# Patient Record
Sex: MALE | Race: WHITE | NOT HISPANIC OR LATINO | Employment: FULL TIME | URBAN - METROPOLITAN AREA
[De-identification: names, ages, dates, MRNs, and addresses within clinical notes are randomized per-mention and may not be internally consistent; named-entity substitution may affect disease eponyms.]

---

## 2017-10-16 DIAGNOSIS — N39.0 RECURRENT UTI: ICD-10-CM

## 2017-10-16 DIAGNOSIS — N31.9 NEUROGENIC BLADDER: ICD-10-CM

## 2017-10-16 RX ORDER — SULFAMETHOXAZOLE/TRIMETHOPRIM 800-160 MG
1 TABLET ORAL DAILY
Qty: 90 TABLET | Refills: 0 | Status: SHIPPED | OUTPATIENT
Start: 2017-10-16 | End: 2018-01-16

## 2018-01-16 DIAGNOSIS — N39.0 RECURRENT UTI: ICD-10-CM

## 2018-01-16 DIAGNOSIS — N31.9 NEUROGENIC BLADDER: ICD-10-CM

## 2018-01-16 DIAGNOSIS — N31.9 NEUROGENIC BLADDER: Primary | ICD-10-CM

## 2018-01-16 RX ORDER — SULFAMETHOXAZOLE/TRIMETHOPRIM 800-160 MG
1 TABLET ORAL DAILY
Qty: 90 TABLET | Refills: 0 | Status: SHIPPED | OUTPATIENT
Start: 2018-01-16 | End: 2018-02-02

## 2018-01-23 ENCOUNTER — TELEPHONE (OUTPATIENT)
Dept: GASTROENTEROLOGY | Facility: CLINIC | Age: 43
End: 2018-01-23

## 2018-01-23 NOTE — TELEPHONE ENCOUNTER
Patient scheduled for Flexible Sigmoidoscopy    Indication for procedure. Surveillance, history of polyps    Referring Provider. Self    ? n/a    Arrival time verified? Instructed patient to arrive at 9:45 AM    Facility location verified? Yes [address] [place] [address]    Instructions given regarding prep and procedure patient declined review, has had exam before    Prep Type Enemas    Are you taking any anticoagulants or blood thinners? no    Instructions given? n/a    Electronic implanted devices? denies    Pre procedure teaching completed? Yes    Transportation from procedure?  policy reviewed    H&P / Pre op physical completed? n/a

## 2018-01-30 DIAGNOSIS — D12.6 BENIGN NEOPLASM OF COLON: Primary | ICD-10-CM

## 2018-01-31 ENCOUNTER — SURGERY (OUTPATIENT)
Age: 43
End: 2018-01-31

## 2018-01-31 ENCOUNTER — HOSPITAL ENCOUNTER (OUTPATIENT)
Facility: AMBULATORY SURGERY CENTER | Age: 43
End: 2018-01-31
Attending: INTERNAL MEDICINE
Payer: COMMERCIAL

## 2018-01-31 VITALS
TEMPERATURE: 98.2 F | OXYGEN SATURATION: 95 % | RESPIRATION RATE: 12 BRPM | DIASTOLIC BLOOD PRESSURE: 95 MMHG | SYSTOLIC BLOOD PRESSURE: 142 MMHG

## 2018-01-31 LAB — FLEXIBLE SIGMOIDOSCOPY: NORMAL

## 2018-02-01 LAB — COPATH REPORT: NORMAL

## 2018-02-02 ENCOUNTER — RADIANT APPOINTMENT (OUTPATIENT)
Dept: ULTRASOUND IMAGING | Facility: CLINIC | Age: 43
End: 2018-02-02
Attending: PHYSICIAN ASSISTANT
Payer: COMMERCIAL

## 2018-02-02 ENCOUNTER — OFFICE VISIT (OUTPATIENT)
Dept: UROLOGY | Facility: CLINIC | Age: 43
End: 2018-02-02
Payer: COMMERCIAL

## 2018-02-02 VITALS
WEIGHT: 280 LBS | OXYGEN SATURATION: 98 % | SYSTOLIC BLOOD PRESSURE: 143 MMHG | HEART RATE: 66 BPM | DIASTOLIC BLOOD PRESSURE: 90 MMHG | HEIGHT: 71 IN | BODY MASS INDEX: 39.2 KG/M2

## 2018-02-02 DIAGNOSIS — N31.9 NEUROGENIC BLADDER: ICD-10-CM

## 2018-02-02 DIAGNOSIS — N31.9 NEUROGENIC BLADDER: Primary | ICD-10-CM

## 2018-02-02 DIAGNOSIS — Q64.10 EXSTROPHY OF URINARY BLADDER: ICD-10-CM

## 2018-02-02 DIAGNOSIS — N39.0 RECURRENT UTI: ICD-10-CM

## 2018-02-02 LAB
ANION GAP SERPL CALCULATED.3IONS-SCNC: 6 MMOL/L (ref 3–14)
BUN SERPL-MCNC: 10 MG/DL (ref 7–30)
CALCIUM SERPL-MCNC: 8.9 MG/DL (ref 8.5–10.1)
CHLORIDE SERPL-SCNC: 104 MMOL/L (ref 94–109)
CO2 SERPL-SCNC: 27 MMOL/L (ref 20–32)
CREAT SERPL-MCNC: 0.81 MG/DL (ref 0.66–1.25)
GFR SERPL CREATININE-BSD FRML MDRD: >90 ML/MIN/1.7M2
GLUCOSE SERPL-MCNC: 177 MG/DL (ref 70–99)
POTASSIUM SERPL-SCNC: 4.4 MMOL/L (ref 3.4–5.3)
SODIUM SERPL-SCNC: 137 MMOL/L (ref 133–144)
VIT B12 SERPL-MCNC: 520 PG/ML (ref 193–986)

## 2018-02-02 PROCEDURE — 76770 US EXAM ABDO BACK WALL COMP: CPT | Performed by: STUDENT IN AN ORGANIZED HEALTH CARE EDUCATION/TRAINING PROGRAM

## 2018-02-02 PROCEDURE — 80048 BASIC METABOLIC PNL TOTAL CA: CPT | Performed by: PHYSICIAN ASSISTANT

## 2018-02-02 PROCEDURE — 99213 OFFICE O/P EST LOW 20 MIN: CPT | Performed by: PHYSICIAN ASSISTANT

## 2018-02-02 PROCEDURE — 36415 COLL VENOUS BLD VENIPUNCTURE: CPT | Performed by: PHYSICIAN ASSISTANT

## 2018-02-02 PROCEDURE — 82607 VITAMIN B-12: CPT | Performed by: PHYSICIAN ASSISTANT

## 2018-02-02 RX ORDER — SULFAMETHOXAZOLE/TRIMETHOPRIM 800-160 MG
1 TABLET ORAL DAILY
Qty: 90 TABLET | Refills: 3 | Status: SHIPPED | OUTPATIENT
Start: 2018-02-02 | End: 2019-02-01

## 2018-02-02 ASSESSMENT — PAIN SCALES - GENERAL: PAINLEVEL: NO PAIN (0)

## 2018-02-02 NOTE — MR AVS SNAPSHOT
After Visit Summary   2/2/2018    Ahmet Castaneda    MRN: [id number]           Patient Information     Date Of Birth          1975        Visit Information        Provider Department      2/2/2018 9:30 AM Yady Foreman PA-C [facility]        Today's Diagnoses     Neurogenic bladder    -  1    Exstrophy of urinary bladder        Recurrent UTI           Follow-ups after your visit        Your next 10 appointments already scheduled     Feb 02, 2018  9:30 AM CST   Return Visit with Yady Foreman PA-C   [facility] ([facility])    [id number] [place] [address] [id number] [phone]              Future tests that were ordered for you today     Open Future Orders        Priority Expected Expires Ordered    US Renal Complete [[id number]] Routine  2/2/2019 2/2/2018    Basic metabolic panel Routine  2/2/2019 2/2/2018    Vitamin B12 Routine  2/2/2019 2/2/2018            Who to contact     If you have questions or need follow up information about today's clinic visit or your schedule please contact [facility] [[email or web address]] directly at [phone].  Normal or non-critical lab and imaging results will be communicated to you by MyChart, letter or phone within 4 business days after the clinic has received the results. If you do not hear from us within 7 days, please contact the clinic through [employer] or phone. If you have a critical or abnormal lab result, we will notify you by phone as soon as possible.  Submit refill requests through [employer] or call your pharmacy and they will forward the refill request to us. Please allow 3 business days for your refill to be completed.          Additional Information About Your Visit        MyChart Information     [employer] gives you secure access to your electronic health record. If you see a primary care provider, you can also send messages to your care team and make appointments. If you have questions,  "please call your primary care clinic.  If you do not have a primary care provider, please call [phone] and they will assist you.      [employer] is an electronic gateway that provides easy, online access to your medical records. With [employer], you can request a clinic appointment, read your test results, renew a prescription or communicate with your care team.     To access your existing account, please contact your [facility] or call [phone] for assistance.        Care EveryWhere ID     This is your Care EveryWhere ID. This could be used by other organizations to access your [facility] medical records  [id number]        Your Vitals Were     Pulse Height Pulse Oximetry BMI (Body Mass Index)          66 1.803 m (5' 11\") 98% 39.05 kg/m2         Blood Pressure from Last 3 Encounters:   02/02/18 143/90   01/31/18 (!) 142/95   09/12/16 (!) 141/94    Weight from Last 3 Encounters:   02/02/18 127 kg (280 lb)   09/12/16 123.8 kg (273 lb)   12/17/14 117.9 kg (260 lb)                 Where to get your medicines      These medications were sent to [facility] [address] [place] [address]  [address] [place], [place] [id number]     Phone:  [phone]     sulfamethoxazole-trimethoprim 800-160 MG per tablet          Primary Care Provider Office Phone # Fax #    Yady Micki Foreman PA-C [phone]       [address] [place] [address]        Equal Access to Services     CAM CASTANO : Hadii valdemar tolentino hadasho Sojohnali, waaxda luqadaha, qaybta kaalmada adeegyamichael, shaniqua lyle. So [facility] [phone].    ATENCIÓN: Si habla español, tiene a elizalde disposición servicios gratuitos de asistencia lingüística. Llame al [phone].    We comply with applicable federal civil rights laws and [place] laws. We do not discriminate on the basis of race, color, national origin, age, disability, sex, sexual orientation, or gender identity.            Thank " you!     Thank you for choosing [facility]  for your care. Our goal is always to provide you with excellent care. Hearing back from our patients is one way we can continue to improve our services. Please take a few minutes to complete the written survey that you may receive in the mail after your visit with us. Thank you!             Your Updated Medication List - Protect others around you: Learn how to safely use, store and throw away your medicines at www.disposemymeds.org.          This list is accurate as of 2/2/18  9:12 AM.  Always use your most recent med list.                   Brand Name Dispense Instructions for use Diagnosis    sulfamethoxazole-trimethoprim 800-160 MG per tablet    BACTRIM DS/SEPTRA DS    90 tablet    Take 1 tablet by mouth daily    Recurrent UTI, Neurogenic bladder

## 2018-02-02 NOTE — PROGRESS NOTES
Follow-up Visit for Neurogenic Bladder    Name: Ahmet Castaneda    MRN: [id number]   YOB: 1975  Accompanied at today's visit by: self                 Chief Complaint:   Neurogenic Bladder          History of Present Illness:   Ahmet Castaneda is a 43 year old male with a history of Bladder extrophy. Was initially treated with cystectomy and ureterosigmoidostomy. This was converted in 2001 to an ileal neobladder and Mitrofanoff because of premalignant lesions on colon. 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 Last cystoscopy was 2009. Had some incontinence between catheterizing in 2012 which has since improved somewhat. He leaks when the neobladder fills. He wears an ostomy appliance over his Mitrofanoff and opens the two piece to catheterize several times a day. This does not bother him much. Has been doing well on his regimen of daily Septra DS.    Previous Bladder Surgeries:    Previous Bladder Augmentation: ileal neobladder    Catheterizable stoma:ileal Osvaldo in 1998. Revisions include: revision of stoma in 2001   Anti-incontinence procedures: none    Botox injections: None      Pertinent Medications:    Current Anticholinergics: None    Current Prophylactic antibiotics: Septra daily      Catheterization History:    The patient catheterizes per Osvaldo stoma with a 18F straight catheter q2 hours. Catheterization is performed by self. The patient uses a clean catheter each time.      Incontinence History:    He does leak between voids/caths per Osvaldo. He does not experience urgency of urination. He does not experience stress urinary incontinence. Patient leaks when neobladder fills, so he wears an ostomy device. He is not bothered by this at this time.      Urinary Tract Infection History:    Patient has been on Septra for many years and has not had UTI during this time. Had infection last time he attempted to wean off antibiotics so his previous urologist  "recommended remaining on long-term abx ppx.    Bowel Movement History:    The patient has a bowel movement q1 days. Bowel regimen includes: none         Past Medical History:   No past medical history on file.         Past Surgical History:     Past Surgical History:   Procedure Laterality Date     C LAP,SURG,COLECTOMY,W/ANAST       extrophy of bladder       MITROFANOFF PROCEDURE (APPENDIX CONDUIT)      neobladder            Allergies:   No Known Allergies         Medications:     Current Outpatient Prescriptions   Medication Sig     sulfamethoxazole-trimethoprim (BACTRIM DS/SEPTRA DS) 800-160 MG per tablet Take 1 tablet by mouth daily     sulfamethoxazole-trimethoprim (BACTRIM DS,SEPTRA DS) 800-160 MG per tablet TAKE ONE TABLET BY MOUTH ONCE DAILY     No current facility-administered medications for this visit.              Review of Systems:    ROS: 10 point ROS neg other than the symptoms noted above in the HPI and PMH.          Physical Exam:   B/P: 141/94, T: Data Unavailable, P: 73, R: Data Unavailable  Estimated body mass index is 39.05 kg/(m^2) as calculated from the following:    Height as of this encounter: 1.803 m (5' 11\").    Weight as of this encounter: 127 kg (280 lb).  General: age-appropriate appearing male in NAD sitting in an exam chair.            Data:    Imaging:   Renal ultrasound from today in process.     Labs:  Creatinine   Date Value Ref Range Status   02/02/2018 0.81 0.66 - 1.25 mg/dL Final       Vitamin B12: today's lab pending. Was 489 (wnl) in 9/2016.           Assessment and Plan:   43 year old male with extrophy who now has a non-continent ileal pouch. Offered future visit with Dr. Tate to discuss surgical procedures to convert this to a continent channel but the patient is not interested at this time. He will contact the clinic should he change his mind. Otherwise doing well with no infections.   -Renal ultrasound and vitamin B12 levels in process. Will chart check for results. Cr " normal.    Follow up: 1 year for repeat renal u/s, BMP, B12. Refills of Septra provided.    Yady Foreman PA-C  September 12, 2016

## 2018-02-02 NOTE — NURSING NOTE
"Ahmet Castaneda's goals for this visit include:   Chief Complaint   Patient presents with     RECHECK     Nerogenic bladder f/u       He requests these members of his care team be copied on today's visit information: Yes    PCP: Yady Foreman    Referring Provider:  Yady Foreman PA-C  [address] [place] [address]    Chief Complaint   Patient presents with     RECHECK     Nerogenic bladder f/u       Initial /90 (BP Location: Left arm, Patient Position: Sitting, Cuff Size: Adult Large)  Pulse 66  Ht 1.803 m (5' 11\")  Wt 127 kg (280 lb)  SpO2 98%  BMI 39.05 kg/m2 Estimated body mass index is 39.05 kg/(m^2) as calculated from the following:    Height as of this encounter: 1.803 m (5' 11\").    Weight as of this encounter: 127 kg (280 lb).  Medication Reconciliation: complete    Do you need any medication refills at today's visit? No    "

## 2019-01-09 ENCOUNTER — TELEPHONE (OUTPATIENT)
Dept: UROLOGY | Facility: CLINIC | Age: 44
End: 2019-01-09

## 2019-01-09 NOTE — TELEPHONE ENCOUNTER
From: Saige Mitchell LPN      Sent: 10/1/2018        To: [facility] Patient Care   Subject: One year f/u                                     Please call to schedule one year f/u with Yady. Renal ultrasound and labs beforehand.       Received the above reminder for patient. Attempted to reach patient by phone, but no answer. Left generic message with request for patient to return call to clinic. When patient returns call, will assist in scheduling yearly follow up with Yady Foreman PA-C at around 2/2/19 with labs and ultrasound prior.    Sho Crespo RN, BSN

## 2019-01-15 NOTE — TELEPHONE ENCOUNTER
Called heather.  He stated that he would prefer a morning appointment because he is going to see his parents in the afternoon.  I told him that I will call him back after everything is scheduled.  I scheduled his US for 2-1-19 at 9:00 with an 8:45 arrival, then labs, then appointment with Yady.    I called him back and left a brief message informing him that he is scheduled for an 8:45 arrival here in [place].  I asked him to give us a call back at [phone] if there is any concern with the appointment times.      Carina Prince, CMA

## 2019-02-01 ENCOUNTER — OFFICE VISIT (OUTPATIENT)
Dept: UROLOGY | Facility: CLINIC | Age: 44
End: 2019-02-01
Payer: COMMERCIAL

## 2019-02-01 ENCOUNTER — ANCILLARY PROCEDURE (OUTPATIENT)
Dept: ULTRASOUND IMAGING | Facility: CLINIC | Age: 44
End: 2019-02-01
Payer: COMMERCIAL

## 2019-02-01 VITALS
RESPIRATION RATE: 18 BRPM | HEART RATE: 67 BPM | OXYGEN SATURATION: 96 % | DIASTOLIC BLOOD PRESSURE: 87 MMHG | SYSTOLIC BLOOD PRESSURE: 141 MMHG

## 2019-02-01 DIAGNOSIS — N31.9 NEUROGENIC BLADDER: ICD-10-CM

## 2019-02-01 DIAGNOSIS — N39.0 RECURRENT UTI: ICD-10-CM

## 2019-02-01 DIAGNOSIS — N31.9 NEUROGENIC BLADDER: Primary | ICD-10-CM

## 2019-02-01 LAB
ANION GAP SERPL CALCULATED.3IONS-SCNC: 7 MMOL/L (ref 3–14)
BUN SERPL-MCNC: 24 MG/DL (ref 7–30)
CALCIUM SERPL-MCNC: 9.3 MG/DL (ref 8.5–10.1)
CHLORIDE SERPL-SCNC: 105 MMOL/L (ref 94–109)
CO2 SERPL-SCNC: 27 MMOL/L (ref 20–32)
CREAT SERPL-MCNC: 0.86 MG/DL (ref 0.66–1.25)
GFR SERPL CREATININE-BSD FRML MDRD: >90 ML/MIN/{1.73_M2}
GLUCOSE SERPL-MCNC: 149 MG/DL (ref 70–99)
POTASSIUM SERPL-SCNC: 4.2 MMOL/L (ref 3.4–5.3)
SODIUM SERPL-SCNC: 139 MMOL/L (ref 133–144)
VIT B12 SERPL-MCNC: 569 PG/ML (ref 193–986)

## 2019-02-01 PROCEDURE — 99213 OFFICE O/P EST LOW 20 MIN: CPT | Performed by: PHYSICIAN ASSISTANT

## 2019-02-01 PROCEDURE — 82607 VITAMIN B-12: CPT | Performed by: PHYSICIAN ASSISTANT

## 2019-02-01 PROCEDURE — 76770 US EXAM ABDO BACK WALL COMP: CPT | Performed by: STUDENT IN AN ORGANIZED HEALTH CARE EDUCATION/TRAINING PROGRAM

## 2019-02-01 PROCEDURE — 36415 COLL VENOUS BLD VENIPUNCTURE: CPT | Performed by: PHYSICIAN ASSISTANT

## 2019-02-01 PROCEDURE — 80048 BASIC METABOLIC PNL TOTAL CA: CPT | Performed by: PHYSICIAN ASSISTANT

## 2019-02-01 RX ORDER — SULFAMETHOXAZOLE/TRIMETHOPRIM 800-160 MG
1 TABLET ORAL DAILY
Qty: 90 TABLET | Refills: 3 | Status: SHIPPED | OUTPATIENT
Start: 2019-02-01 | End: 2020-03-02

## 2019-02-01 ASSESSMENT — PAIN SCALES - GENERAL: PAINLEVEL: NO PAIN (0)

## 2019-02-01 NOTE — PROGRESS NOTES
Follow-up Visit for Neurogenic Bladder    Name: Ahmet Castaneda    MRN: [id number]   YOB: 1975  Accompanied at today's visit by: self                 Chief Complaint:   Neurogenic Bladder          History of Present Illness:   Ahmet Castaneda is a 44 year old male with a history of Bladder exstrophy. Was initially treated with cystectomy and ureterosigmoidostomy. This was converted in 2001 to an ileal neobladder and Mitrofanoff because of premalignant lesions on colon. 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 Last cystoscopy was 2009. Had some incontinence between catheterizing in 2012 which has since improved somewhat. He leaks when the neobladder fills. He wears an ostomy appliance over his Mitrofanoff and opens the two piece to catheterize several times a day. This does not bother him much. Has been doing well on his regimen of daily Septra DS.    Returns today for annual follow up with a renal ultrasound and blood work beforehand. He is doing well with no concerns.    The following subcategories were reviewed with the patient and updated accordingly. If no updates, this indicates no change since last visit:  Previous Bladder Surgeries:    Previous Bladder Augmentation: ileal neobladder    Catheterizable stoma: ileal Osvaldo in 1998. Revisions include: revision of stoma in 2001   Anti-incontinence procedures: none    Botox injections: None      Pertinent Medications:    Current Anticholinergics: None    Current Prophylactic antibiotics: Septra DS daily      Catheterization History:    The patient catheterizes per Osvaldo stoma with a 18F straight catheter q2 hours. Catheterization is performed by self. The patient uses a clean catheter each time.      Incontinence History:    He does leak between voids/caths per Osvaldo. He does not experience urgency of urination. He does not experience stress urinary incontinence. Patient leaks when neobladder fills, so he  "wears an ostomy device. He is not bothered by this at this time.      Urinary Tract Infection History:    Patient has been on Septra for many years and has not had UTI during this time. Had infection last time he attempted to wean off antibiotics so his previous urologist recommended remaining on long-term abx ppx.    Bowel Movement History:    The patient has a bowel movement q1 days. Bowel regimen includes: none         Past Medical History:   No past medical history on file.         Past Surgical History:     Past Surgical History:   Procedure Laterality Date     C LAP,SURG,COLECTOMY,W/ANAST       extrophy of bladder       MITROFANOFF PROCEDURE (APPENDIX CONDUIT)      neobladder            Allergies:   No Known Allergies         Medications:     Current Outpatient Medications   Medication Sig     sulfamethoxazole-trimethoprim (BACTRIM DS/SEPTRA DS) 800-160 MG per tablet Take 1 tablet by mouth daily     No current facility-administered medications for this visit.              Review of Systems:    ROS: 10 point ROS neg other than the symptoms noted above in the HPI and PMH.          Physical Exam:   B/P: 141/94, T: Data Unavailable, P: 73, R: Data Unavailable  Estimated body mass index is 39.05 kg/m  as calculated from the following:    Height as of 2/2/18: 1.803 m (5' 11\").    Weight as of 2/2/18: 127 kg (280 lb).  General: age-appropriate appearing male in NAD sitting in an exam chair.    Respiratory: no increased respiratory effort          Data:    Imaging:   ULTRASOUND, KIDNEYS AND BLADDER  2/1/2019   The right kidney measures 12.6 cm in length. Normal homogeneous  parenchymal echogenicity. No focal cortical scarring. No  hydronephrosis, mass, or stone.     The left kidney measures 11.9 cm in length. Normal homogeneous  parenchymal echogenicity. No focal cortical scarring. No  hydronephrosis, mass or stone.     The neobladder is partially distended.       Incidental partially visualized liver appears " hyperechoic suggesting  underlying hepatic steatosis, similar to prior exam.    IMPRESSION: Normal ultrasound of the kidneys.      Labs:  Last Comprehensive Metabolic Panel:  Sodium   Date Value Ref Range Status   02/01/2019 139 133 - 144 mmol/L Final     Potassium   Date Value Ref Range Status   02/01/2019 4.2 3.4 - 5.3 mmol/L Final     Chloride   Date Value Ref Range Status   02/01/2019 105 94 - 109 mmol/L Final     Carbon Dioxide   Date Value Ref Range Status   02/01/2019 27 20 - 32 mmol/L Final     Anion Gap   Date Value Ref Range Status   02/01/2019 7 3 - 14 mmol/L Final     Glucose   Date Value Ref Range Status   02/01/2019 149 (H) 70 - 99 mg/dL Final     Urea Nitrogen   Date Value Ref Range Status   02/01/2019 24 7 - 30 mg/dL Final     Creatinine   Date Value Ref Range Status   02/01/2019 0.86 0.66 - 1.25 mg/dL Final     GFR Estimate   Date Value Ref Range Status   02/01/2019 >90 >60 mL/min/[1.73_m2] Final     Comment:     Non  GFR Calc  Starting 12/18/2018, serum creatinine based estimated GFR (eGFR) will be   calculated using the Chronic Kidney Disease Epidemiology Collaboration   (CKD-EPI) equation.       Calcium   Date Value Ref Range Status   02/01/2019 9.3 8.5 - 10.1 mg/dL Final       Vitamin B12: in process           Assessment and Plan:   44 year old male with a history of bladder exstrophy who now has a non-continent ileal pouch. He is doing well with no infections and no concerns.   -Renal ultrasound and kidney function today within normal limits.  -Vitamin B12 in process. Will chart check.  -Blood glucose slightly elevated (he is fasting aside from a glass of milk around 1:00 AM this morning). Advised to follow up with PCP for further monitoring/screening for DM.    Follow up: 1 year for repeat renal u/s, BMP, B12. Refills of Septra provided.    Yady Foreman PA-C  February 1, 2019

## 2019-02-01 NOTE — NURSING NOTE
Ahmet Castaneda's goals for this visit include:   Chief Complaint   Patient presents with     Follow Up     Pt states things are going well. No major concerns.      He requests these members of his care team be copied on today's visit information:     PCP: Yady Foreman    Referring Provider:  No referring provider defined for this encounter.    /87 (BP Location: Left arm, Patient Position: Sitting, Cuff Size: Adult Large)   Pulse 67   Resp 18   SpO2 96%     Do you need any medication refills at today's visit? No    Maureen Vega LPN

## 2019-07-22 ENCOUNTER — MYC MEDICAL ADVICE (OUTPATIENT)
Dept: UROLOGY | Facility: CLINIC | Age: 44
End: 2019-07-22

## 2019-11-03 ENCOUNTER — HEALTH MAINTENANCE LETTER (OUTPATIENT)
Age: 44
End: 2019-11-03

## 2020-03-02 ENCOUNTER — MYC REFILL (OUTPATIENT)
Dept: UROLOGY | Facility: CLINIC | Age: 45
End: 2020-03-02

## 2020-03-02 DIAGNOSIS — N39.0 RECURRENT UTI: ICD-10-CM

## 2020-03-02 DIAGNOSIS — N31.9 NEUROGENIC BLADDER: ICD-10-CM

## 2020-03-02 RX ORDER — SULFAMETHOXAZOLE/TRIMETHOPRIM 800-160 MG
1 TABLET ORAL DAILY
Qty: 90 TABLET | Refills: 3 | OUTPATIENT
Start: 2020-03-02

## 2020-03-02 NOTE — TELEPHONE ENCOUNTER
Medication request: sulfamethoxazole-trimethoprim (BACTRIM DS/SEPTRA DS) 800-160 MG tablet  Sig: Take 1 tablet by mouth daily   Prescription written: 2/1/19  Last Qty: 90  Pt's last office visit: 2/1/19  Next scheduled office visit: none    Chart reviewed. Yearly follow up appointment with Yady Foreman PA-C needed before any additional refills. My chart message sent to patient with request to contact clinic to schedule a follow up visit.    Sho Crespo RN, BSN

## 2020-03-02 NOTE — TELEPHONE ENCOUNTER
Duplicate request. See 3/2/20 telephone encounter. Will close this encounter at this time.    Sho Crespo RN, BSN

## 2020-03-03 ENCOUNTER — TELEPHONE (OUTPATIENT)
Dept: UROLOGY | Facility: CLINIC | Age: 45
End: 2020-03-03

## 2020-03-03 DIAGNOSIS — N31.9 NEUROGENIC BLADDER: Primary | ICD-10-CM

## 2020-03-03 NOTE — TELEPHONE ENCOUNTER
[facility] Call Center    Phone Message    May a detailed message be left on voicemail: yes     Reason for Call: Order(s): Other:   Reason for requested: Patient scheduled his yearly appointment and said he would need lab work and a renal ultrasound prior to the appointment.  Date needed: 3/13/2020  Provider name: Yady Foreman      Action Taken: Message routed to:  Adult Clinics: Urology p [id number]    Travel Screening: Not Applicable

## 2020-03-03 NOTE — TELEPHONE ENCOUNTER
Chart reviewed and appointment with Yady Foreman PA-C scheduled for 3/13/20. Will send refill request to Yady Foreman PA-C to review and sign if appropriate.    Sho Crespo RN, BSN

## 2020-03-04 RX ORDER — SULFAMETHOXAZOLE/TRIMETHOPRIM 800-160 MG
1 TABLET ORAL DAILY
Qty: 90 TABLET | Refills: 3 | Status: SHIPPED | OUTPATIENT
Start: 2020-03-04 | End: 2021-03-23

## 2020-03-04 NOTE — TELEPHONE ENCOUNTER
Per Yady Foreman PA-C, labs and imagine were ordered. Writer called patient to inform him of this and gave patient the number to call for imaging. Per chart review, patients Bactrim was also signed by Yady Foreman PA-C today. Patient had no further questions.    Yady Colbert LPN

## 2020-03-12 NOTE — PROGRESS NOTES
Follow-up Visit for Neurogenic Bladder    Name: Ahmet Castaneda    MRN: [id number]   YOB: 1975  Accompanied at today's visit by: self                 Chief Complaint:   Neurogenic Bladder          History of Present Illness:   Ahmet Castaneda is a 45 year old male with a history of bladder exstrophy. Was initially treated with cystectomy and ureterosigmoidostomy. This was converted in 2001 to an ileal neobladder and Mitrofanoff because of premalignant lesions on colon. 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 Last cystoscopy was 2009. Had some incontinence between catheterizing in 2012 which has since improved somewhat. He leaks when the neobladder fills. He wears an ostomy appliance over his Mitrofanoff and opens the two piece to catheterize several times a day. This does not bother him much. Has been doing well on his regimen of daily Septra DS with no UTI's. Last visit with us on 2/1/19 at which time he was doing well.    Returns today for annual follow up with a renal ultrasound and blood work beforehand. He is doing well. Only concern is some tightness of the foreskin, which makes it challenging to retract the skin back to clean. He also notices some pain with erections. He wonders if this may be weight-related as he has gained 10 lbs over the past year.     The following subcategories were reviewed with the patient and updated accordingly. If no updates, this indicates no change since last visit:  Previous Bladder Surgeries:    Previous Bladder Augmentation: ileal neobladder    Catheterizable stoma: ileal Osvaldo in 1998. Revisions include: revision of stoma in 2001   Anti-incontinence procedures: none    Botox injections: None      Pertinent Medications:    Current Anticholinergics: None    Current Prophylactic antibiotics: Septra DS daily      Catheterization History:    The patient catheterizes per Osvaldo stoma with an 18F straight catheter q2  hours. Catheterization is performed by self. The patient uses a clean catheter each time.      Incontinence History:    He does leak between voids/caths per Osvaldo. He does not experience urgency of urination. He does not experience stress urinary incontinence. Patient leaks when neobladder fills, so he wears an ostomy device. He is not bothered by this at this time.      Urinary Tract Infection History:    Patient has been on Septra for many years and has not had UTI during this time. Had infection last time he attempted to wean off antibiotics so his previous urologist recommended remaining on long-term abx ppx.    Bowel Movement History:    The patient has a bowel movement q1 days. Bowel regimen includes: none         Past Medical History:   No past medical history on file.         Past Surgical History:     Past Surgical History:   Procedure Laterality Date     C LAP,SURG,COLECTOMY,W/ANAST       extrophy of bladder       MITROFANOFF PROCEDURE (APPENDIX CONDUIT)      neobladder            Allergies:   No Known Allergies         Medications:     Current Outpatient Medications   Medication Sig     sulfamethoxazole-trimethoprim (BACTRIM DS) 800-160 MG tablet Take 1 tablet by mouth daily     No current facility-administered medications for this visit.              Review of Systems:    ROS: 10 point ROS neg other than the symptoms noted above in the HPI and PMH.          Physical Exam:   BP (!) 154/96 (BP Location: Left arm, Patient Position: Sitting, Cuff Size: Adult Regular)   Pulse 77   SpO2 97%   General: age-appropriate appearing male in NAD sitting in an exam chair.    Respiratory: no increased respiratory effort  Abdomen: non-distended. Ostomy appliance over the stoma containing clear yellow urine.  : Mild phimosis. Foreskin is able to be retracted with mild difficulty. Mild erythema around the corona but no evidence for infection.          Data:    Imaging:   ULTRASOUND, KIDNEYS AND BLADDER  3/13/2020    IMPRESSION:  1.  No hydronephrosis, cystic or solid mass.      Labs:  Creatinine   Date Value Ref Range Status   03/13/2020 0.79 0.66 - 1.25 mg/dL Final       Vitamin B12: 563   (WNL)           Assessment and Plan:   45 year old male with a history of bladder exstrophy who now has a non-continent ileal pouch. He is doing well with no infections. Mild phimosis noted on exam today.    -Renal ultrasound, kidney function, and vit B12 today within normal limits.  -Continue Bactrim DS once daily.   -For the phimosis, discussed options of topical corticosteroid, dorsal slit, vs circumcision. He will start with topical corticosteroid.   -Clobetasol 0.05% cream - apply topically BID with gentle stretching of the foreskin. Do this for 2-3 weeks, take 1 week off, then repeat.   -If no improvement, he will notify us and can then send to urologist to consider dorsal slit vs circumcision.     Follow up: 1 year with me with renal u/s, BMP, B12 beforehand.    Yady Foreman PA-C  March 13, 2020

## 2020-03-13 ENCOUNTER — OFFICE VISIT (OUTPATIENT)
Dept: UROLOGY | Facility: CLINIC | Age: 45
End: 2020-03-13
Payer: COMMERCIAL

## 2020-03-13 ENCOUNTER — ANCILLARY PROCEDURE (OUTPATIENT)
Dept: ULTRASOUND IMAGING | Facility: CLINIC | Age: 45
End: 2020-03-13
Attending: PHYSICIAN ASSISTANT
Payer: COMMERCIAL

## 2020-03-13 VITALS — SYSTOLIC BLOOD PRESSURE: 154 MMHG | OXYGEN SATURATION: 97 % | DIASTOLIC BLOOD PRESSURE: 96 MMHG | HEART RATE: 77 BPM

## 2020-03-13 DIAGNOSIS — N47.1 PHIMOSIS: ICD-10-CM

## 2020-03-13 DIAGNOSIS — N31.9 NEUROGENIC BLADDER: ICD-10-CM

## 2020-03-13 DIAGNOSIS — N31.9 NEUROGENIC BLADDER: Primary | ICD-10-CM

## 2020-03-13 LAB
CREAT SERPL-MCNC: 0.79 MG/DL (ref 0.66–1.25)
GFR SERPL CREATININE-BSD FRML MDRD: >90 ML/MIN/{1.73_M2}
VIT B12 SERPL-MCNC: 563 PG/ML (ref 193–986)

## 2020-03-13 PROCEDURE — 82607 VITAMIN B-12: CPT | Performed by: PHYSICIAN ASSISTANT

## 2020-03-13 PROCEDURE — 76770 US EXAM ABDO BACK WALL COMP: CPT | Performed by: RADIOLOGY

## 2020-03-13 PROCEDURE — 82565 ASSAY OF CREATININE: CPT | Performed by: PHYSICIAN ASSISTANT

## 2020-03-13 PROCEDURE — 36415 COLL VENOUS BLD VENIPUNCTURE: CPT | Performed by: PHYSICIAN ASSISTANT

## 2020-03-13 PROCEDURE — 99213 OFFICE O/P EST LOW 20 MIN: CPT | Performed by: PHYSICIAN ASSISTANT

## 2020-03-13 RX ORDER — CLOBETASOL PROPIONATE 0.5 MG/G
CREAM TOPICAL
Qty: 30 G | Refills: 0 | Status: SHIPPED | OUTPATIENT
Start: 2020-03-13 | End: 2021-10-04

## 2020-03-13 NOTE — NURSING NOTE
Ahmet Castaneda's goals for this visit include:   Chief Complaint   Patient presents with     Follow Up     1 year follow up       He requests these members of his care team be copied on today's visit information:     PCP: Yady Foreman    Referring Provider:  No referring provider defined for this encounter.    BP (!) 154/96 (BP Location: Left arm, Patient Position: Sitting, Cuff Size: Adult Regular)   Pulse 77   SpO2 97%     Do you need any medication refills at today's visit? No    Yady Colbert LPN

## 2020-11-16 ENCOUNTER — HEALTH MAINTENANCE LETTER (OUTPATIENT)
Age: 45
End: 2020-11-16

## 2021-03-23 DIAGNOSIS — N31.9 NEUROGENIC BLADDER: ICD-10-CM

## 2021-03-23 DIAGNOSIS — N39.0 RECURRENT UTI: ICD-10-CM

## 2021-03-23 RX ORDER — SULFAMETHOXAZOLE/TRIMETHOPRIM 800-160 MG
1 TABLET ORAL DAILY
Qty: 90 TABLET | Refills: 0 | Status: SHIPPED | OUTPATIENT
Start: 2021-03-23 | End: 2021-05-07

## 2021-03-23 RX ORDER — SULFAMETHOXAZOLE/TRIMETHOPRIM 800-160 MG
1 TABLET ORAL DAILY
Qty: 90 TABLET | Refills: 3 | Status: CANCELLED | OUTPATIENT
Start: 2021-03-23

## 2021-03-23 NOTE — TELEPHONE ENCOUNTER
Medication request: sulfamethoxazole-trimethoprim (BACTRIM DS) 800-160 MG tablet  Sig: Take 1 tablet by mouth daily, Disp-90 tablet  Prescription written: 03/04/2020  Last filled: 11/13/2020  Last Qty: 90  Pt's last office visit:   Next scheduled office visit:     Prescription sent to pharmacy by provider.  Pharmacy will contact patient when ready for .  Javad Myers CMA

## 2021-03-23 NOTE — TELEPHONE ENCOUNTER
Yady Foreman PA-C  [facility] Urology Adult [place] 1 hour ago (11:51 AM)     One refill authorized. Needs follow up for additional refills.   Thanks!   Yady Foreman PA-C      Received message from Yady Foreman PA-C. Per note below, patient will return call to clinic at a later time to schedule follow up visit.    Sho Crespo RN, BSN

## 2021-03-23 NOTE — TELEPHONE ENCOUNTER
Patient wanted a refill on his Septra. Patient called to request refill.  Writer told patient that he would need a follow up visit in order to keep getting his medications as it has been a year since his last follow up/ med check appointment.  Writer asked if patient wanted to schedule  a follow up visit  and the patient declined at this time. Patient told writer he will in the near future when he has his schedule handy.  Patient had no further questions or concerns.  Javad Myers, CMA

## 2021-04-22 ENCOUNTER — TELEPHONE (OUTPATIENT)
Dept: UROLOGY | Facility: CLINIC | Age: 46
End: 2021-04-22

## 2021-04-22 DIAGNOSIS — N31.9 NEUROGENIC BLADDER: Primary | ICD-10-CM

## 2021-04-22 NOTE — TELEPHONE ENCOUNTER
Per 3/13/20 visit with Yady Foreman PA-C, patient to follow up in 1 year with Yady Foreman PA-C with renal ultrasound, BMP, and B12 beforehand. Future orders for renal ultrasound, BMP, and B12 placed and sent for Yady Foreman PA-C to review and sign.     Called and spoke to patient who is aware that orders have been placed. Informed patient that his follow up visit could be virtual or in person. Per patient, he is available on 5/7/21 to complete the ultrasound, labs, and follow up visit. Appointment with Yady Foreman PA-C scheduled for 5/7/21 at 11:00am. Informed patient that the ultrasound can be scheduled through the imaging department and writer will schedule the lab appointment 10-15 minutes prior to the imaging appointment. Patient verbalized understanding and was transferred to imaging to schedule renal ultrasound.     Sho Crespo RN, BSN

## 2021-04-22 NOTE — TELEPHONE ENCOUNTER
Renal ultrasound scheduled for 5/7/21 at 10:00am. Lab only appointment scheduled for 5/7/21 at 9:50am.    Sho Crespo RN, BSN

## 2021-04-22 NOTE — TELEPHONE ENCOUNTER
[facility] Call Center    Phone Message    May a detailed message be left on voicemail: yes     Reason for Call: Pt would like to schedule his yearly follow up with Yady Kellen.  He said that he will need labs and an US before his visit with her.  No orders are placed for lab or US.  Pt is wondering if you can place the orders and call to let him know once they are placed so he can schedule.  Thanks.    Action Taken: Message routed to:  Adult Clinics: Urology p [id number]    Travel Screening: Not Applicable

## 2021-05-05 NOTE — PROGRESS NOTES
Follow-up Visit for Neurogenic Bladder    Name: Ahmet Catsaneda    MRN: [id number]   YOB: 1975  Accompanied at today's visit by: self                 Chief Complaint:   Neurogenic Bladder         Assessment and Plan:   46 year old male with a history of bladder exstrophy who now has a non-continent ileal pouch. He is doing well with no UTIs.  -Renal ultrasound and kidney function within normal limits per my read (official radiology read pending at this time; will chart check). Blood glucose has been consistently elevated for the last few years. He does not have a PCP. Will check hemoglobin A1C today. Encouraged to establish with PCP.  -Continue Bactrim DS once daily. Refilled.   -For the mild phimosis, topical steroid cream and gentle stretching was not helpful. Not ready to consider a procedure but will let us know if he changes his mind.     Follow up: 1 year with me with renal u/s, serum creatinine, B12 beforehand.    Yady Foreman PA-C  May 7, 2021          History of Present Illness:   Ahmet Castaneda is a 46 year old male with a history of bladder exstrophy. Was initially treated with cystectomy and ureterosigmoidostomy. This was converted in 2001 to an ileal neobladder and Mitrofanoff because of premalignant lesions on colon. 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 Last cystoscopy was 2009. Had some incontinence between catheterizing in 2012 which has since improved somewhat. He leaks when the neobladder fills. He wears an ostomy appliance over his Mitrofanoff and opens the two piece to catheterize several times a day. This does not bother him much. Has been doing well on his regimen of daily Septra DS with no UTI's. Last visit with us on 3/13/2020 at which time he was doing well other than some tightness of the foreskin, which made it challenging to retract the skin back to clean. He had also noticed some mild pain with erections. He  wonders if this may be weight-related as he gained 10 lbs over the preceding year. On exam, he ws noted to have mild phimosis. The foreskin was retractable with mild difficulty. There was mild erythema around the corona but no evidence for infection. He was offered trial of topical clobetasol and gentle stretching vs. dorsal slit vs. circumcision. He elected for the topical steroid.     Returns today for annual follow up with a renal ultrasound and blood work beforehand. He is doing well. The clobetasol cream did not really help with the tight foreskin. States it is not a major problem at present; doesn't want to consider any procedures at this time. Is still able to retract the skin to clean when he needs. This is slightly more difficult after an erection.     The following subcategories were reviewed with the patient and updated accordingly. If no updates, this indicates no change since last visit:  Previous Bladder Surgeries:    Previous Bladder Augmentation: ileal neobladder    Catheterizable stoma: ileal Osvaldo in 1998. Revisions include: revision of stoma in 2001   Anti-incontinence procedures: none    Botox injections: None      Pertinent Medications:    Current Anticholinergics: None    Current Prophylactic antibiotics: Septra DS daily      Catheterization History:    The patient catheterizes per Osvaldo stoma with an 18F straight catheter q2 hours. Catheterization is performed by self. The patient uses a clean catheter each time.      Incontinence History:    He does leak between voids/caths per Osvaldo. He does not experience urgency of urination. He does not experience stress urinary incontinence. Patient leaks when neobladder fills, so he wears an ostomy device. He is not bothered by this at this time.      Urinary Tract Infection History:    Patient has been on Septra for many years and has not had UTI during this time. Had infection last time he attempted to wean off antibiotics so his previous urologist  recommended remaining on long-term abx ppx.    Bowel Movement History:    The patient has a bowel movement q1 days. Bowel regimen includes: none         Past Medical History:   No past medical history on file.         Past Surgical History:     Past Surgical History:   Procedure Laterality Date     C LAP,SURG,COLECTOMY,W/ANAST       extrophy of bladder       MITROFANOFF PROCEDURE (APPENDIX CONDUIT)      neobladder            Allergies:   No Known Allergies         Medications:     Current Outpatient Medications   Medication Sig     clobetasol (TEMOVATE) 0.05 % external cream Apply topically to the foreskin twice daily for 2 weeks, followed by one week off, then repeat.     sulfamethoxazole-trimethoprim (BACTRIM DS) 800-160 MG tablet Take 1 tablet by mouth daily     No current facility-administered medications for this visit.              Review of Systems:    ROS: 10 point ROS neg other than the symptoms noted above in the HPI and PMH.          Physical Exam:   There were no vitals taken for this visit.  General: age-appropriate appearing male in NAD sitting in an exam chair.    Respiratory: no increased respiratory effort  Abdomen: non-distended. Ostomy appliance over the stoma containing clear yellow urine. The stoma appears pink and healthy.  : deferred.  Extremities: multiple soft, non-tender subcutaneous masses on both arms of varying sizes - seem consistent with lipomas.          Data:    Imaging:   ULTRASOUND, KIDNEYS AND BLADDER  5/7/2021  No hydronephrosis, stones, or mass. Bladder distended.   Radiology read pending.       Labs:  Last Comprehensive Metabolic Panel:  Sodium   Date Value Ref Range Status   05/07/2021 137 133 - 144 mmol/L Final     Potassium   Date Value Ref Range Status   05/07/2021 4.0 3.4 - 5.3 mmol/L Final     Chloride   Date Value Ref Range Status   05/07/2021 106 94 - 109 mmol/L Final     Carbon Dioxide   Date Value Ref Range Status   05/07/2021 25 20 - 32 mmol/L Final     Anion Gap    Date Value Ref Range Status   05/07/2021 6 3 - 14 mmol/L Final     Glucose   Date Value Ref Range Status   05/07/2021 138 (H) 70 - 99 mg/dL Final     Urea Nitrogen   Date Value Ref Range Status   05/07/2021 18 7 - 30 mg/dL Final     Creatinine   Date Value Ref Range Status   05/07/2021 0.84 0.66 - 1.25 mg/dL Final     GFR Estimate   Date Value Ref Range Status   05/07/2021 >90 >60 mL/min/[1.73_m2] Final     Comment:     Non  GFR Calc  Starting 12/18/2018, serum creatinine based estimated GFR (eGFR) will be   calculated using the Chronic Kidney Disease Epidemiology Collaboration   (CKD-EPI) equation.       Calcium   Date Value Ref Range Status   05/07/2021 8.9 8.5 - 10.1 mg/dL Final         Vitamin B12 from today in process.

## 2021-05-07 ENCOUNTER — OFFICE VISIT (OUTPATIENT)
Dept: UROLOGY | Facility: CLINIC | Age: 46
End: 2021-05-07
Payer: COMMERCIAL

## 2021-05-07 ENCOUNTER — ANCILLARY PROCEDURE (OUTPATIENT)
Dept: ULTRASOUND IMAGING | Facility: CLINIC | Age: 46
End: 2021-05-07
Attending: PHYSICIAN ASSISTANT
Payer: COMMERCIAL

## 2021-05-07 DIAGNOSIS — N31.9 NEUROGENIC BLADDER: ICD-10-CM

## 2021-05-07 DIAGNOSIS — R73.9 ELEVATED RANDOM BLOOD GLUCOSE LEVEL: ICD-10-CM

## 2021-05-07 DIAGNOSIS — N39.0 RECURRENT UTI: ICD-10-CM

## 2021-05-07 DIAGNOSIS — N31.9 NEUROGENIC BLADDER: Primary | ICD-10-CM

## 2021-05-07 LAB
ANION GAP SERPL CALCULATED.3IONS-SCNC: 6 MMOL/L (ref 3–14)
BUN SERPL-MCNC: 18 MG/DL (ref 7–30)
CALCIUM SERPL-MCNC: 8.9 MG/DL (ref 8.5–10.1)
CHLORIDE SERPL-SCNC: 106 MMOL/L (ref 94–109)
CO2 SERPL-SCNC: 25 MMOL/L (ref 20–32)
CREAT SERPL-MCNC: 0.84 MG/DL (ref 0.66–1.25)
GFR SERPL CREATININE-BSD FRML MDRD: >90 ML/MIN/{1.73_M2}
GLUCOSE SERPL-MCNC: 138 MG/DL (ref 70–99)
HBA1C MFR BLD: 7.3 % (ref 0–5.6)
POTASSIUM SERPL-SCNC: 4 MMOL/L (ref 3.4–5.3)
SODIUM SERPL-SCNC: 137 MMOL/L (ref 133–144)
VIT B12 SERPL-MCNC: 481 PG/ML (ref 193–986)

## 2021-05-07 PROCEDURE — 76770 US EXAM ABDO BACK WALL COMP: CPT

## 2021-05-07 PROCEDURE — 36415 COLL VENOUS BLD VENIPUNCTURE: CPT | Performed by: PHYSICIAN ASSISTANT

## 2021-05-07 PROCEDURE — 80048 BASIC METABOLIC PNL TOTAL CA: CPT | Performed by: PHYSICIAN ASSISTANT

## 2021-05-07 PROCEDURE — 82607 VITAMIN B-12: CPT | Performed by: PHYSICIAN ASSISTANT

## 2021-05-07 PROCEDURE — 99214 OFFICE O/P EST MOD 30 MIN: CPT | Performed by: PHYSICIAN ASSISTANT

## 2021-05-07 PROCEDURE — 83036 HEMOGLOBIN GLYCOSYLATED A1C: CPT | Performed by: PHYSICIAN ASSISTANT

## 2021-05-07 RX ORDER — SULFAMETHOXAZOLE/TRIMETHOPRIM 800-160 MG
1 TABLET ORAL DAILY
Qty: 90 TABLET | Refills: 3 | Status: SHIPPED | OUTPATIENT
Start: 2021-05-07 | End: 2022-05-26

## 2021-05-07 NOTE — PATIENT INSTRUCTIONS
UROLOGY CLINIC VISIT PATIENT INSTRUCTIONS    Follow up in 1 year with repeat kidney ultrasound and blood work.    Recommend that you establish care with a primary care provider.     Will send the results of additional blood tests when available.     If you have any issues, questions or concerns in the meantime, do not hesitate to contact us at [phone] or via [facility].     It was a pleasure meeting with you today.  Thank you for allowing me and my team the privilege of caring for you today.  YOU are the reason we are here, and I truly hope we provided you with the excellent service you deserve.  Please let us know if there is anything else we can do for you so that we can be sure you are leaving completely satisfied with your care experience.

## 2021-05-07 NOTE — NURSING NOTE
Ahmet Castaneda's goals for this visit include:   Chief Complaint   Patient presents with     Follow Up     annual follow up for neurogenic bladder       He requests these members of his care team be copied on today's visit information:     PCP: Yady Foreman    Referring Provider:  No referring provider defined for this encounter.    There were no vitals taken for this visit.    Do you need any medication refills at today's visit?     Yady Colbert LPN on 5/7/2021 at 10:48 AM

## 2021-05-29 ENCOUNTER — RECORDS - HEALTHEAST (OUTPATIENT)
Dept: ADMINISTRATIVE | Facility: CLINIC | Age: 46
End: 2021-05-29

## 2021-05-31 ENCOUNTER — RECORDS - HEALTHEAST (OUTPATIENT)
Dept: ADMINISTRATIVE | Facility: CLINIC | Age: 46
End: 2021-05-31

## 2021-08-09 ENCOUNTER — MYC MEDICAL ADVICE (OUTPATIENT)
Dept: UROLOGY | Facility: CLINIC | Age: 46
End: 2021-08-09

## 2021-08-10 NOTE — TELEPHONE ENCOUNTER
Sho Crespo, RN  [facility] Urology Adult [place]; [facility] Procedure Coordinator 46 minutes ago (9:56 AM)   MB  Hello,     When you get a chance, would you be able to contact patient to assist in scheduling an in person visit with Dr. Hall. Okay to offer one of the cysto spots on 9/22.     Thank you,     Sho Crespo RN, BSN

## 2021-08-10 NOTE — CONFIDENTIAL NOTE
Yady Foreman PA-C  You; [facility] Urology Adult [place] 11 minutes ago (9:24 AM)   CHRISSY Berkowitz,   Yes, please have patient see Dr. Hall for phimosis that did not respond to topical steroids. In person visit would be best.   Thanks!   Yady Foreman PA-C      Received the above message from Yady Foreman PA-C. My chart message sent to patient. Message sent to scheduling team with request to contact patient to assist in scheduling.    Sho Crespo RN, BSN

## 2021-08-10 NOTE — TELEPHONE ENCOUNTER
1st attempt to schedule as noted below. Message left for patient to return call and schedule.    Vashti Sage  Surgical Specialties Procedure   [facility] [place]  8/10/2021 10:43 AM

## 2021-09-18 ENCOUNTER — HEALTH MAINTENANCE LETTER (OUTPATIENT)
Age: 46
End: 2021-09-18

## 2021-09-22 ENCOUNTER — TELEPHONE (OUTPATIENT)
Dept: UROLOGY | Facility: CLINIC | Age: 46
End: 2021-09-22

## 2021-09-22 ENCOUNTER — OFFICE VISIT (OUTPATIENT)
Dept: UROLOGY | Facility: CLINIC | Age: 46
End: 2021-09-22
Payer: COMMERCIAL

## 2021-09-22 VITALS
HEART RATE: 57 BPM | SYSTOLIC BLOOD PRESSURE: 170 MMHG | WEIGHT: 287 LBS | BODY MASS INDEX: 41.09 KG/M2 | HEIGHT: 70 IN | DIASTOLIC BLOOD PRESSURE: 101 MMHG

## 2021-09-22 DIAGNOSIS — N47.1 PHIMOSIS: Primary | ICD-10-CM

## 2021-09-22 DIAGNOSIS — Z11.59 ENCOUNTER FOR SCREENING FOR OTHER VIRAL DISEASES: ICD-10-CM

## 2021-09-22 PROBLEM — E11.9 DIABETES MELLITUS, TYPE 2 (H): Status: ACTIVE | Noted: 2021-09-22

## 2021-09-22 PROCEDURE — 99207 PR NO BILLABLE SERVICE THIS VISIT: CPT | Performed by: UROLOGY

## 2021-09-22 PROCEDURE — 99214 OFFICE O/P EST MOD 30 MIN: CPT | Performed by: UROLOGY

## 2021-09-22 RX ORDER — CEFAZOLIN SODIUM IN 0.9 % NACL 3 G/100 ML
3 INTRAVENOUS SOLUTION, PIGGYBACK (ML) INTRAVENOUS SEE ADMIN INSTRUCTIONS
Status: CANCELLED | OUTPATIENT
Start: 2021-09-22

## 2021-09-22 RX ORDER — CEFAZOLIN SODIUM IN 0.9 % NACL 3 G/100 ML
3 INTRAVENOUS SOLUTION, PIGGYBACK (ML) INTRAVENOUS
Status: CANCELLED | OUTPATIENT
Start: 2021-09-22

## 2021-09-22 ASSESSMENT — MIFFLIN-ST. JEOR: SCORE: 2178.07

## 2021-09-22 ASSESSMENT — PAIN SCALES - GENERAL: PAINLEVEL: NO PAIN (0)

## 2021-09-22 NOTE — NURSING NOTE
"Chief Complaint   Patient presents with     Consult     circumcision        Blood pressure (!) 170/101, pulse 57, height 1.778 m (5' 10\"), weight 130.2 kg (287 lb). Body mass index is 41.18 kg/m .    Patient Active Problem List   Diagnosis     Exstrophy of urinary bladder       No Known Allergies    Current Outpatient Medications   Medication Sig Dispense Refill     clobetasol (TEMOVATE) 0.05 % external cream Apply topically to the foreskin twice daily for 2 weeks, followed by one week off, then repeat. (Patient not taking: Reported on 5/7/2021) 30 g 0     sulfamethoxazole-trimethoprim (BACTRIM DS) 800-160 MG tablet Take 1 tablet by mouth daily 90 tablet 3       Social History     Tobacco Use     Smoking status: Never Smoker     Smokeless tobacco: Never Used   Substance Use Topics     Alcohol use: Yes     Comment: occasional     Drug use: Kiki Reyez  9/22/2021  12:06 PM  "

## 2021-09-22 NOTE — LETTER
"9/22/2021       RE: Ahmet Castaneda  [address] [place] [address]     Dear Colleague,    Thank you for referring your patient, Ahmet Castaneda, to the [facility] UROLOGY CLINIC [place] at [facility]. Please see a copy of my visit note below.    HPI:  Ahmet Castaneda is a 46 year old male being seen for bladder exstrophy  He has a nonconduit ileal pouch.   Was initially treated with cystectomy and ureterosigmoidostomy. This was converted in 2001 to an ileal neobladder and Mitrofanoff because of premalignant lesions on colon. 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 Last cystoscopy was 2009. Had some incontinence between catheterizing in 2012 which has since improved somewhat. He leaks when the neobladder fills. He wears an ostomy appliance over his Mitrofanoff and opens the two piece to catheterize several times a day.   Phimosis is impressive and hurts when he tries to expose glans or gets erection.  It tears and is difficult to maintain hygiene.    Exam:  BP (!) 170/101   Pulse 57   Ht 1.778 m (5' 10\")   Wt 130.2 kg (287 lb)   BMI 41.18 kg/m    NAD  Abdomen soft  Epispadias present with impressive phimotic ring.  Midline incision compatible with exstrophy.  Minimal soft tissue coverage over midline pubis.      Review of Labs:  The following labs were reviewed by me and discussed with the patient:  Cr 0.84    Hb A1c 7.4    Assessment & Plan   Exstrophy/epispadias  Has impressive phimosis  I recommend a circumcision with limited skin removal to improve phimosis.  Risks, benefits discussed.    Cruz Joseph MD  Reconstructive Urology  [facility]      "

## 2021-09-22 NOTE — TELEPHONE ENCOUNTER
Patient is scheduled for surgery with Dr. Joseph     Spoke with: Edilma ZHAO spoke to patient in clinic face to face 09/22/21    Date of Surgery: Monday 10/04/21    Location: [facility]    Informed patient they will need an adult  Yes    Pre op with Provider na    H&P: Scheduled with Patient will schedule with PCP.     Pre-procedure COVID-19 Test: Friday 10/01/21 at [facility]     Additional imaging/appointments: 2 week post op video visit w/Dr. Joseph Wednesday 10/20/21.    Surgery packet: Edilma ZHAO gave to patient in clinic 09/22/21.      Additional comments: na

## 2021-09-22 NOTE — PROGRESS NOTES
HPI:  Ahmet Castaneda is a 46 year old male being seen for follow-up of phimosis.  History of extrophy and Mitrofanoff procedure.  History of penoscrotal transposition surgery, it sounds, in elementary age.  He's had multiple reconstructive procedures, uretheral stricture repairs, meatoplasty, etc.  Dr. Jamie Lord removed some scar tissue from the penis/scrotum about 20 years ago.    He has phimosis- failed topcial steroid- used off and on.    Further history: Bladder extrophy. Was initially treated with cystectomy and ureterosigmoidostomy. This was converted in 2001 to an ileal neobladder and Mitrofanoff because of premalignant lesions on colon. 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 Last cystoscopy was 2009   He wears an ostomy appliance over his Mitrofanoff.      Previous Bladder Surgeries:   Previous Bladder Augmentation: ileal neobladder   Catheterizable stoma:ileal Osvaldo in 1998. Revisions include: revision of stoma in 2001       Exam:  Physical Exam  There were no vitals taken for this visit.    General: Alert, oriented, nad.  Pleasant and conversant.  Eyes: anicteric, EOMI.  Pulse: regular  Resps: normal, non-labored.  Abdomen:  Nondistended.  Neurological - no tremors  Skin - no discoloration/ lesions noted   exam   Phallus uncircumcised appearance.  Mild epispadias consistent with surgical repair.  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  He can fully retract the skin but anatomy is a bit complicated and I'm concerned if he had a normal circ he would have insufficient shaft skin length.  ROSALIO deferred     Review of Imaging:  The following imaging exams were independently viewed and interpreted by me and discussed with patient:  N/A     Review of Labs:  The following labs were reviewed by me and discussed with the patient:  N/A     Assessment & Plan   1. Phimosis  a. Recommended referral to Dr. Joseph for more  complex reconstructive options- may require skin graft or buried penis repair.  I am worried a standard circ would leave too short of shaft skin.  Should be able to see Dr. Joseph today.    Mitesh Hall MD  [facility]      ==========================      Additional Coding Information:    Problems:  3 -- one stable chronic illness    Data Reviewed  Review of the result(s) of each unique test - none    Tests ordered: none    Level of risk:  3 -- low risk (e.g., OTC medication or observation, minor surgery without risks)    Time spent:  15 minutes spent on the date of the encounter doing chart review, history and exam, documentation and further activities per the note

## 2021-09-22 NOTE — NURSING NOTE
Ahmet Castaneda's goals for this visit include:   Chief Complaint   Patient presents with     Consult For     Phimosis     He requests these members of his care team be copied on today's visit information:     PCP: Yady Foreman    Referring Provider:  No referring provider defined for this encounter.    There were no vitals taken for this visit.    Do you need any medication refills at today's visit? No    Saige Ceron, CMA on 9/22/2021 at 10:28 AM

## 2021-09-22 NOTE — PROGRESS NOTES
"HPI:  Ahmet Castaneda is a 46 year old male being seen for bladder exstrophy  He has a nonconduit ileal pouch.   Was initially treated with cystectomy and ureterosigmoidostomy. This was converted in 2001 to an ileal neobladder and Mitrofanoff because of premalignant lesions on colon. 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 Last cystoscopy was 2009. Had some incontinence between catheterizing in 2012 which has since improved somewhat. He leaks when the neobladder fills. He wears an ostomy appliance over his Mitrofanoff and opens the two piece to catheterize several times a day.   Phimosis is impressive and hurts when he tries to expose glans or gets erection.  It tears and is difficult to maintain hygiene.    Exam:  BP (!) 170/101   Pulse 57   Ht 1.778 m (5' 10\")   Wt 130.2 kg (287 lb)   BMI 41.18 kg/m    NAD  Abdomen soft  Epispadias present with impressive phimotic ring.  Midline incision compatible with exstrophy.  Minimal soft tissue coverage over midline pubis.      Review of Labs:  The following labs were reviewed by me and discussed with the patient:  Cr 0.84    Hb A1c 7.4    Assessment & Plan   Exstrophy/epispadias  Has impressive phimosis  I recommend a circumcision with limited skin removal to improve phimosis.  Risks, benefits discussed.    Cruz Joseph MD  Reconstructive Urology  [facility]    "

## 2021-10-01 ENCOUNTER — ANESTHESIA EVENT (OUTPATIENT)
Dept: SURGERY | Facility: AMBULATORY SURGERY CENTER | Age: 46
End: 2021-10-01
Payer: COMMERCIAL

## 2021-10-01 ENCOUNTER — LAB (OUTPATIENT)
Dept: LAB | Facility: CLINIC | Age: 46
End: 2021-10-01
Payer: COMMERCIAL

## 2021-10-01 DIAGNOSIS — Z11.59 ENCOUNTER FOR SCREENING FOR OTHER VIRAL DISEASES: ICD-10-CM

## 2021-10-01 LAB — SARS-COV-2 RNA RESP QL NAA+PROBE: NEGATIVE

## 2021-10-01 PROCEDURE — U0003 INFECTIOUS AGENT DETECTION BY NUCLEIC ACID (DNA OR RNA); SEVERE ACUTE RESPIRATORY SYNDROME CORONAVIRUS 2 (SARS-COV-2) (CORONAVIRUS DISEASE [COVID-19]), AMPLIFIED PROBE TECHNIQUE, MAKING USE OF HIGH THROUGHPUT TECHNOLOGIES AS DESCRIBED BY CMS-2020-01-R: HCPCS

## 2021-10-01 PROCEDURE — U0005 INFEC AGEN DETEC AMPLI PROBE: HCPCS

## 2021-10-04 ENCOUNTER — HOSPITAL ENCOUNTER (OUTPATIENT)
Facility: AMBULATORY SURGERY CENTER | Age: 46
End: 2021-10-04
Attending: UROLOGY
Payer: COMMERCIAL

## 2021-10-04 ENCOUNTER — PRE VISIT (OUTPATIENT)
Dept: UROLOGY | Facility: CLINIC | Age: 46
End: 2021-10-04

## 2021-10-04 ENCOUNTER — ANESTHESIA (OUTPATIENT)
Dept: SURGERY | Facility: AMBULATORY SURGERY CENTER | Age: 46
End: 2021-10-04
Payer: COMMERCIAL

## 2021-10-04 VITALS
HEART RATE: 64 BPM | OXYGEN SATURATION: 98 % | TEMPERATURE: 97.2 F | HEIGHT: 70 IN | BODY MASS INDEX: 41.09 KG/M2 | RESPIRATION RATE: 20 BRPM | WEIGHT: 287 LBS | DIASTOLIC BLOOD PRESSURE: 81 MMHG | SYSTOLIC BLOOD PRESSURE: 125 MMHG

## 2021-10-04 DIAGNOSIS — N47.1 PHIMOSIS: ICD-10-CM

## 2021-10-04 PROCEDURE — 88304 TISSUE EXAM BY PATHOLOGIST: CPT | Mod: TC | Performed by: UROLOGY

## 2021-10-04 PROCEDURE — 54161 CIRCUM 28 DAYS OR OLDER: CPT

## 2021-10-04 PROCEDURE — 54161 CIRCUM 28 DAYS OR OLDER: CPT | Mod: GC | Performed by: UROLOGY

## 2021-10-04 RX ORDER — SODIUM CHLORIDE, SODIUM LACTATE, POTASSIUM CHLORIDE, CALCIUM CHLORIDE 600; 310; 30; 20 MG/100ML; MG/100ML; MG/100ML; MG/100ML
INJECTION, SOLUTION INTRAVENOUS CONTINUOUS
Status: DISCONTINUED | OUTPATIENT
Start: 2021-10-04 | End: 2021-10-05 | Stop reason: HOSPADM

## 2021-10-04 RX ORDER — CEFAZOLIN SODIUM IN 0.9 % NACL 3 G/100 ML
3 INTRAVENOUS SOLUTION, PIGGYBACK (ML) INTRAVENOUS
Status: DISCONTINUED | OUTPATIENT
Start: 2021-10-04 | End: 2021-10-04 | Stop reason: HOSPADM

## 2021-10-04 RX ORDER — FENTANYL CITRATE 50 UG/ML
25 INJECTION, SOLUTION INTRAMUSCULAR; INTRAVENOUS EVERY 5 MIN PRN
Status: DISCONTINUED | OUTPATIENT
Start: 2021-10-04 | End: 2021-10-04 | Stop reason: HOSPADM

## 2021-10-04 RX ORDER — CEFAZOLIN SODIUM IN 0.9 % NACL 3 G/100 ML
3 INTRAVENOUS SOLUTION, PIGGYBACK (ML) INTRAVENOUS SEE ADMIN INSTRUCTIONS
Status: DISCONTINUED | OUTPATIENT
Start: 2021-10-04 | End: 2021-10-04 | Stop reason: HOSPADM

## 2021-10-04 RX ORDER — LIDOCAINE 40 MG/G
CREAM TOPICAL
Status: DISCONTINUED | OUTPATIENT
Start: 2021-10-04 | End: 2021-10-04 | Stop reason: HOSPADM

## 2021-10-04 RX ORDER — OXYCODONE HYDROCHLORIDE 5 MG/1
5 TABLET ORAL EVERY 6 HOURS PRN
Qty: 15 TABLET | Refills: 0 | Status: SHIPPED | OUTPATIENT
Start: 2021-10-04 | End: 2021-10-09

## 2021-10-04 RX ORDER — FENTANYL CITRATE 50 UG/ML
INJECTION, SOLUTION INTRAMUSCULAR; INTRAVENOUS PRN
Status: DISCONTINUED | OUTPATIENT
Start: 2021-10-04 | End: 2021-10-04

## 2021-10-04 RX ORDER — ACETAMINOPHEN 325 MG/1
975 TABLET ORAL ONCE
Status: COMPLETED | OUTPATIENT
Start: 2021-10-04 | End: 2021-10-04

## 2021-10-04 RX ORDER — ONDANSETRON 2 MG/ML
INJECTION INTRAMUSCULAR; INTRAVENOUS PRN
Status: DISCONTINUED | OUTPATIENT
Start: 2021-10-04 | End: 2021-10-04

## 2021-10-04 RX ORDER — ONDANSETRON 2 MG/ML
4 INJECTION INTRAMUSCULAR; INTRAVENOUS EVERY 30 MIN PRN
Status: DISCONTINUED | OUTPATIENT
Start: 2021-10-04 | End: 2021-10-05 | Stop reason: HOSPADM

## 2021-10-04 RX ORDER — MEPERIDINE HYDROCHLORIDE 25 MG/ML
12.5 INJECTION INTRAMUSCULAR; INTRAVENOUS; SUBCUTANEOUS
Status: DISCONTINUED | OUTPATIENT
Start: 2021-10-04 | End: 2021-10-05 | Stop reason: HOSPADM

## 2021-10-04 RX ORDER — PROPOFOL 10 MG/ML
INJECTION, EMULSION INTRAVENOUS PRN
Status: DISCONTINUED | OUTPATIENT
Start: 2021-10-04 | End: 2021-10-04

## 2021-10-04 RX ORDER — BUPIVACAINE HYDROCHLORIDE 5 MG/ML
INJECTION, SOLUTION PERINEURAL PRN
Status: DISCONTINUED | OUTPATIENT
Start: 2021-10-04 | End: 2021-10-04 | Stop reason: HOSPADM

## 2021-10-04 RX ORDER — ONDANSETRON 4 MG/1
4 TABLET, ORALLY DISINTEGRATING ORAL EVERY 30 MIN PRN
Status: DISCONTINUED | OUTPATIENT
Start: 2021-10-04 | End: 2021-10-05 | Stop reason: HOSPADM

## 2021-10-04 RX ORDER — PROPOFOL 10 MG/ML
INJECTION, EMULSION INTRAVENOUS CONTINUOUS PRN
Status: DISCONTINUED | OUTPATIENT
Start: 2021-10-04 | End: 2021-10-04

## 2021-10-04 RX ORDER — SODIUM CHLORIDE, SODIUM LACTATE, POTASSIUM CHLORIDE, CALCIUM CHLORIDE 600; 310; 30; 20 MG/100ML; MG/100ML; MG/100ML; MG/100ML
INJECTION, SOLUTION INTRAVENOUS CONTINUOUS
Status: DISCONTINUED | OUTPATIENT
Start: 2021-10-04 | End: 2021-10-04 | Stop reason: HOSPADM

## 2021-10-04 RX ORDER — OXYCODONE HYDROCHLORIDE 5 MG/1
5 TABLET ORAL EVERY 4 HOURS PRN
Status: DISCONTINUED | OUTPATIENT
Start: 2021-10-04 | End: 2021-10-05 | Stop reason: HOSPADM

## 2021-10-04 RX ADMIN — PROPOFOL 50 MG: 10 INJECTION, EMULSION INTRAVENOUS at 14:03

## 2021-10-04 RX ADMIN — PROPOFOL 150 MCG/KG/MIN: 10 INJECTION, EMULSION INTRAVENOUS at 14:04

## 2021-10-04 RX ADMIN — SODIUM CHLORIDE, SODIUM LACTATE, POTASSIUM CHLORIDE, CALCIUM CHLORIDE: 600; 310; 30; 20 INJECTION, SOLUTION INTRAVENOUS at 13:57

## 2021-10-04 RX ADMIN — SODIUM CHLORIDE, SODIUM LACTATE, POTASSIUM CHLORIDE, CALCIUM CHLORIDE: 600; 310; 30; 20 INJECTION, SOLUTION INTRAVENOUS at 12:10

## 2021-10-04 RX ADMIN — FENTANYL CITRATE 50 MCG: 50 INJECTION, SOLUTION INTRAMUSCULAR; INTRAVENOUS at 14:02

## 2021-10-04 RX ADMIN — PROPOFOL 50 MG: 10 INJECTION, EMULSION INTRAVENOUS at 14:08

## 2021-10-04 RX ADMIN — PROPOFOL 50 MCG/KG/MIN: 10 INJECTION, EMULSION INTRAVENOUS at 14:01

## 2021-10-04 RX ADMIN — ACETAMINOPHEN 975 MG: 325 TABLET ORAL at 12:09

## 2021-10-04 RX ADMIN — FENTANYL CITRATE 50 MCG: 50 INJECTION, SOLUTION INTRAMUSCULAR; INTRAVENOUS at 14:17

## 2021-10-04 RX ADMIN — ONDANSETRON 4 MG: 2 INJECTION INTRAMUSCULAR; INTRAVENOUS at 14:27

## 2021-10-04 ASSESSMENT — MIFFLIN-ST. JEOR: SCORE: 2178.07

## 2021-10-04 NOTE — ANESTHESIA PREPROCEDURE EVALUATION
Anesthesia Pre-Procedure Evaluation    Patient: Ahmet Castaneda   MRN: [id number] : 1975        Preoperative Diagnosis: Phimosis [N47.1]    Procedure : Procedure(s):  CIRCUMCISION          No past medical history on file.   Past Surgical History:   Procedure Laterality Date     C LAP,SURG,COLECTOMY,W/ANAST       extrophy of bladder       MITROFANOFF PROCEDURE (APPENDIX CONDUIT)      neobladder      No Known Allergies   Social History     Tobacco Use     Smoking status: Never Smoker     Smokeless tobacco: Never Used   Substance Use Topics     Alcohol use: Yes     Comment: occasional      Wt Readings from Last 1 Encounters:   10/04/21 130.2 kg (287 lb)        Anesthesia Evaluation   Pt has had prior anesthetic.     No history of anesthetic complications       ROS/MED HX  ENT/Pulmonary:  - neg pulmonary ROS     Neurologic:  - neg neurologic ROS     Cardiovascular:     (+) hypertension-----    METS/Exercise Tolerance: >4 METS    Hematologic:  - neg hematologic  ROS     Musculoskeletal:  - neg musculoskeletal ROS     GI/Hepatic:  - neg GI/hepatic ROS     Renal/Genitourinary:  - neg Renal ROS     Endo:     (+) type II DM, Not using insulin,     Psychiatric/Substance Use:  - neg psychiatric ROS     Infectious Disease:  - neg infectious disease ROS     Malignancy:  - neg malignancy ROS     Other:  - neg other ROS          Physical Exam    Airway  airway exam normal           Respiratory Devices and Support         Dental  no notable dental history         Cardiovascular   cardiovascular exam normal          Pulmonary   pulmonary exam normal                OUTSIDE LABS:  CBC: No results found for: WBC, HGB, HCT, PLT  BMP:   Lab Results   Component Value Date     2021     2019    POTASSIUM 4.0 2021    POTASSIUM 4.2 2019    CHLORIDE 106 2021    CHLORIDE 105 2019    CO2 25 2021    CO2 27 2019    BUN 18 2021    BUN 24 2019    CR 0.84 2021    CR  0.79 03/13/2020     (H) 05/07/2021     (H) 02/01/2019     COAGS: No results found for: PTT, INR, FIBR  POC: No results found for: BGM, HCG, HCGS  HEPATIC: No results found for: ALBUMIN, PROTTOTAL, ALT, AST, GGT, ALKPHOS, BILITOTAL, BILIDIRECT, KAEL  OTHER:   Lab Results   Component Value Date    A1C 7.3 (H) 05/07/2021    ETHAN 8.9 05/07/2021       Anesthesia Plan    ASA Status:  3   NPO Status:  NPO Appropriate    Anesthesia Type: MAC.     - Reason for MAC: straight local not clinically adequate              Consents    Anesthesia Plan(s) and associated risks, benefits, and realistic alternatives discussed. Questions answered and patient/representative(s) expressed understanding.     - Discussed with:  Patient      - Specific Concerns: Conversion to GA, awareness/recall, major complications.        Postoperative Care    Pain management: IV analgesics, Oral pain medications, Multi-modal analgesia.   PONV prophylaxis: Ondansetron (or other 5HT-3), Background Propofol Infusion     Comments:                Jarvis Courtney MD

## 2021-10-04 NOTE — ANESTHESIA POSTPROCEDURE EVALUATION
Patient: Ahmet Castaneda    Procedure: Procedure(s):  CIRCUMCISION       Diagnosis:Phimosis [N47.1]  Diagnosis Additional Information: No value filed.    Anesthesia Type:  No value filed.    Note:  Disposition: Outpatient   Postop Pain Control: Uneventful            Sign Out: Well controlled pain   PONV: No   Neuro/Psych: Uneventful            Sign Out: Acceptable/Baseline neuro status   Airway/Respiratory: Uneventful            Sign Out: Acceptable/Baseline resp. status   CV/Hemodynamics: Uneventful            Sign Out: Acceptable CV status; No obvious hypovolemia; No obvious fluid overload   Other NRE: NONE   DID A NON-ROUTINE EVENT OCCUR? No           Last vitals:  Vitals Value Taken Time   /57 10/04/21 1454   Temp 36.2  C (97.2  F) 10/04/21 1454   Pulse 65 10/04/21 1454   Resp 18 10/04/21 1454   SpO2 95 % 10/04/21 1454       Electronically Signed By: Hamzah Jennings DO  October 4, 2021  2:56 PM

## 2021-10-04 NOTE — OP NOTE
OPERATIVE REPORT    PREOPERATIVE DIAGNOSIS:   1.  Phimosis  2.  Bladder exstrophy/epispadias  POSTOPERATIVE DIAGNOSIS: Same    PROCEDURES PERFORMED:   1. Circumcision     STAFF SURGEON: Cruz Joseph MD   ASSISTANT: Yady Humphrey MD  ANESTHESIA: Local & MAC  ESTIMATED BLOOD LOSS: < 10 mL.   IV FLUIDS: see dictated anesthesia record  COMPLICATIONS: None.   SPECIMEN:  Foreskin    SIGNIFICANT FINDINGS:   Phimotic band burying glans. Glans now visible.     BRIEF OPERATIVE INDICATIONS: Ahmet Castaneda is a 46 year old male with a history of bladder exstrophy s/p multiple surgeries. He has phimosis which causes painful erections and makes hygiene quite difficult. 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 Following a thorough discussion of the risks and benefits of surgical intervention, the patient elected to proceed with a circumcision revision.    DESCRIPTION OF OPERATION: After informed consent was obtained, the patient was taken to the operating room and placed in the supine position. Pneumoboots were applied and the genitals were prepped and draped in the normal fashion. A timeout was performed with the operating room staff.    The case was begun by performing a dorsal nerve and then a ring block at the base of the penis with 10 mL 0.25% marcaine without epi. We began by making a dorsal and ventral slit using Bovie electrocautery. We took just enough tissue to reduce the prepuce. We then marked our proximal and distal tissue edges for excision. Given his anatomy, we spared as much penile skin as possible while still removing the phimotic band. The skin and subcutaneous tissue were incised with a 15 blade scalpel. The redundant skin was then excised with iris scissors and passed off. Hemostasis was achieved with a combination of direct pressure and electrocautery. The tissue defect was then closed with 3-0 vicryl sutures in an interrupted fashion. Bacitracin was applied. Fluffs and  mesh panties were applied. The patient tolerated the procedure well and was taken in stable condition to the recovery room.    DISPO:  - Return to clinic for wound check in 3 weeks.     As attending surgeon, Cruz BEST MD, was scrubbed and present for the entire procedure.

## 2021-10-04 NOTE — BRIEF OP NOTE
[facility] [place]    Brief Operative Note    Pre-operative diagnosis: Phimosis [N47.1]  Post-operative diagnosis Same as pre-operative diagnosis    Procedure: Procedure(s):  CIRCUMCISION  Surgeon: Surgeon(s) and Role:     * Cruz Joseph MD - Primary     * Yady Humphrey MD - Resident - Assisting  Anesthesia: Monitor Anesthesia Care   Estimated Blood Loss: 10 mL  Drains: None  Specimens:   ID Type Source Tests Collected by Time Destination   1 : foreskin Tissue Penis SURGICAL PATHOLOGY EXAM Cruz Joseph MD 10/4/2021  2:26 PM      Findings:  Phimotic band burying glans. Glans now visible. Unremarkable circumcision.  Complications: None.  Implants: * No implants in log *  Dispo: PACU then home. No dressing applied. RTC for wound check on 10/20.    Yady Humphrey MD  PGY-4 Urology  Pager [phone]

## 2021-10-04 NOTE — DISCHARGE INSTRUCTIONS
[facility]  Home Care Following Anesthesia  For 24 hours after surgery:  1. Get plenty of rest.  A responsible adult must stay with you for at least 24 hours after you leave the surgery center.  2. Do not drive or use heavy equipment.  If you have weakness or tingling, don't drive or use heavy equipment until this feeling goes away.   3. Do not drink alcohol.   4. Avoid strenuous or risky activities.  Ask for help when climbing stairs.  5. You may feel lightheaded.  IF so, sit for a few minutes before standing.  Have someone help you get up.   6. If you have nausea (feel sick to your stomach): Drink only clear liquids such as apple juice, ginger ale, broth or 7-Up.  Rest may also help.  Be sure to drink enough fluids.  Move to a regular diet as you feel able.   7. You may have a slight fever.  Call the doctor if your fever is over 100 F (37.7 C) (taken under the tongue) or lasts longer than 24 hours.  8. You may have a dry mouth, a sore throat, muscle aches or trouble sleeping. These should go away after 24 hours.  9. Do not make important or legal decisions.   10. It is recommended to avoid smoking.               Tips for taking pain medications  To get the best pain relief possible, remember these points:    Take pain medications as directed, before pain becomes severe.    Pain medication can upset your stomach: taking it with food may help.    Constipation is a common side effect of pain medication. Drink plenty of  fluids.    Eat foods high in fiber. Take a stool softener if recommended by your doctor or pharmacist.    Do not drink alcohol, drive or operate machinery while taking pain medications.    Ask about other ways to control pain, such as with heat, ice or relaxation.    Tylenol/Acetaminophen Consumption  To help encourage the safe use of acetaminophen, the makers of TYLENOL  have lowered the maximum daily dose for single-ingredient Extra Strength TYLENOL   (acetaminophen) products sold in the [place] from 8 pills per day (4,000 mg) to 6 pills per day (3,000 mg). The dosing interval has also changed from 2 pills every 4-6 hours to 2 pills every 6 hours.    If you feel your pain relief is insufficient, you may take Tylenol/Acetaminophen in addition to your narcotic pain medication.     Be careful not to exceed 3,000 mg of Tylenol/Acetaminophen in a 24 hour period from all sources.    If you are taking extra strength Tylenol/acetaminophen (500 mg), the maximum dose is 6 tablets in 24 hours.    If you are taking regular strength acetaminophen (325 mg), the maximum dose is 9 tablets in 24 hours.    975 mg Tylenol given at 12:10 PM. Okay to take another dose at 6:10 PM, then follow instructions on the bottle.     Call a doctor for any of the followin. Signs of infection (fever, growing tenderness at the surgery site, a large amount of drainage or bleeding, severe pain, foul-smelling drainage, redness, swelling).  2. It has been over 8 to 10 hours since surgery and you are still not able to urinate (pass water).  3. Headache for over 24 hours.  4. Signs of Covid-19 infection (temperature over 100 degrees, shortness of breath, cough, loss of taste/smell, generalized body aches, persistent headache, chills, sore throat, nausea/vomiting/diarrhea)  Your doctor is:       Dr. Cruz Joseph, Prostate and Urology: [phone]               Or dial [phone] and ask for the resident on call for:  Prostate Urology  For emergency care, call the:  [place]:  [phone] (TTY for hearing impaired: [phone])

## 2021-10-04 NOTE — ANESTHESIA CARE TRANSFER NOTE
Patient: Ahmet Castaneda    Procedure: Procedure(s):  CIRCUMCISION       Diagnosis: Phimosis [N47.1]  Diagnosis Additional Information: No value filed.    Anesthesia Type:   No value filed.     Note:    Oropharynx: oropharynx clear of all foreign objects  Level of Consciousness: awake  Oxygen Supplementation: face mask    Independent Airway: airway patency satisfactory and stable  Dentition: dentition unchanged  Vital Signs Stable: post-procedure vital signs reviewed and stable  Report to RN Given: handoff report given  Patient transferred to: Phase II    Handoff Report: Identifed the Patient, Identified the Reponsible Provider, Reviewed the pertinent medical history, Discussed the surgical course, Reviewed Intra-OP anesthesia mangement and issues during anesthesia, Set expectations for post-procedure period and Allowed opportunity for questions and acknowledgement of understanding      Vitals:  Vitals Value Taken Time   /57 10/04/21 1454   Temp 36.2  C (97.2  F) 10/04/21 1454   Pulse 65 10/04/21 1454   Resp 18 10/04/21 1454   SpO2 95 % 10/04/21 1454       Electronically Signed By: HENRRY Macdonlad CRNA  October 4, 2021  2:56 PM

## 2021-10-08 LAB
PATH REPORT.COMMENTS IMP SPEC: NORMAL
PATH REPORT.COMMENTS IMP SPEC: NORMAL
PATH REPORT.FINAL DX SPEC: NORMAL
PATH REPORT.GROSS SPEC: NORMAL
PATH REPORT.MICROSCOPIC SPEC OTHER STN: NORMAL
PHOTO IMAGE: NORMAL

## 2021-10-08 PROCEDURE — 88304 TISSUE EXAM BY PATHOLOGIST: CPT | Mod: 26 | Performed by: PATHOLOGY

## 2021-10-20 ENCOUNTER — VIRTUAL VISIT (OUTPATIENT)
Dept: UROLOGY | Facility: CLINIC | Age: 46
End: 2021-10-20
Payer: COMMERCIAL

## 2021-10-20 VITALS — WEIGHT: 290 LBS | HEIGHT: 71 IN | BODY MASS INDEX: 40.6 KG/M2

## 2021-10-20 DIAGNOSIS — N47.1 PHIMOSIS: Primary | ICD-10-CM

## 2021-10-20 PROCEDURE — 99213 OFFICE O/P EST LOW 20 MIN: CPT | Performed by: UROLOGY

## 2021-10-20 ASSESSMENT — PAIN SCALES - GENERAL: PAINLEVEL: NO PAIN (0)

## 2021-10-20 ASSESSMENT — MIFFLIN-ST. JEOR: SCORE: 2199.62

## 2021-10-20 NOTE — NURSING NOTE
"Chief Complaint   Patient presents with     Follow Up     s/p circumcision       Height 1.791 m (5' 10.5\"), weight 131.5 kg (290 lb). Body mass index is 41.02 kg/m .    Patient Active Problem List   Diagnosis     Exstrophy of urinary bladder     Phimosis     Diabetes mellitus, type 2 (H)       No Known Allergies    Current Outpatient Medications   Medication Sig Dispense Refill     sulfamethoxazole-trimethoprim (BACTRIM DS) 800-160 MG tablet Take 1 tablet by mouth daily 90 tablet 3       Social History     Tobacco Use     Smoking status: Never Smoker     Smokeless tobacco: Never Used   Substance Use Topics     Alcohol use: Yes     Comment: occasional     Drug use: No       Torsten Bronson EMT  10/20/2021  12:22 PM  "

## 2021-10-20 NOTE — PROGRESS NOTES
Ahmet is a 46 year old who is being evaluated via a billable video visit.      Video Visit Technology for this patient: [employer] Video Visit- Patient was left in waiting room    How would you like to obtain your AVS? MyChart  If the video visit is dropped, the invitation should be resent by: Send to e-mail at: [email or web address]  Will anyone else be joining your video visit? No      Video Start Time: 12:45p  Video-Visit Details    Type of service:  Video Visit    Video End Time: 1:00p    Originating Location (pt. Location): Home    Distant Location (provider location):  [facility] [place]     Platform used for Video Visit: Jean Paul    Ahmet Castaneda is a 46 year old male with a history of bladder exstrophy s/p multiple surgeries. He has phimosis which causes painful erections and makes hygiene quite difficult. 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     He has a noncontinent ileal pouch.      He underwent circumcision on 10/4/2021.    Pathology was benign.  He states he is healing well without issues.  Glans is able to be exposed now.    Last B12 was May 2021 - 481  Creatinine - 0.84     S/p circumcision for phimosis. Healed well.  Followup in May with Yady Foreman with BMP/B12/Renal US  Can followup with me if new surgical issues arise.    Cruz Joseph MD

## 2021-10-20 NOTE — LETTER
10/20/2021       RE: Ahmet Castaneda  [address] [place] [address]     Dear Colleague,    Thank you for referring your patient, Ahmet Castaneda, to the [facility] [place] at [facility]. Please see a copy of my visit note below.    Ahmet is a 46 year old who is being evaluated via a billable video visit.      Video Visit Technology for this patient: [facility] Video Visit- Patient was left in waiting room    How would you like to obtain your AVS? MyChart  If the video visit is dropped, the invitation should be resent by: Send to e-mail at: [email or web address]  Will anyone else be joining your video visit? No      Video Start Time: 12:45p  Video-Visit Details    Type of service:  Video Visit    Video End Time: 1:00p    Originating Location (pt. Location): Home    Distant Location (provider location):  [facility]     Platform used for Video Visit: Jean Paul    Ahmet Castaneda is a 46 year old male with a history of bladder exstrophy s/p multiple surgeries. He has phimosis which causes painful erections and makes hygiene quite difficult. 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     He has a noncontinent ileal pouch.      He underwent circumcision on 10/4/2021.    Pathology was benign.  He states he is healing well without issues.  Glans is able to be exposed now.    Last B12 was May 2021 - 481  Creatinine - 0.84     S/p circumcision for phimosis. Healed well.  Followup in May with Yady Foreman with BMP/B12/Renal US  Can followup with me if new surgical issues arise.    Cruz Joseph MD

## 2021-11-13 ENCOUNTER — HEALTH MAINTENANCE LETTER (OUTPATIENT)
Age: 46
End: 2021-11-13

## 2022-01-08 ENCOUNTER — HEALTH MAINTENANCE LETTER (OUTPATIENT)
Age: 47
End: 2022-01-08

## 2022-03-05 ENCOUNTER — HEALTH MAINTENANCE LETTER (OUTPATIENT)
Age: 47
End: 2022-03-05

## 2022-03-08 ENCOUNTER — TELEPHONE (OUTPATIENT)
Dept: UROLOGY | Facility: CLINIC | Age: 47
End: 2022-03-08
Payer: COMMERCIAL

## 2022-03-08 NOTE — TELEPHONE ENCOUNTER
3/8/22 2nd attempt to get Renal US and Labs scheduled LVM.      [facility] Procedure   Neurology & Neurosurgery Specialties  [facility] [place] [place]   [phone]

## 2022-05-06 ENCOUNTER — LAB (OUTPATIENT)
Dept: LAB | Facility: CLINIC | Age: 47
End: 2022-05-06
Payer: COMMERCIAL

## 2022-05-06 ENCOUNTER — ANCILLARY PROCEDURE (OUTPATIENT)
Dept: ULTRASOUND IMAGING | Facility: CLINIC | Age: 47
End: 2022-05-06
Attending: PHYSICIAN ASSISTANT
Payer: COMMERCIAL

## 2022-05-06 DIAGNOSIS — N31.9 NEUROGENIC BLADDER: ICD-10-CM

## 2022-05-06 LAB
CREAT SERPL-MCNC: 0.91 MG/DL (ref 0.66–1.25)
GFR SERPL CREATININE-BSD FRML MDRD: >90 ML/MIN/1.73M2
VIT B12 SERPL-MCNC: 441 PG/ML (ref 193–986)

## 2022-05-06 PROCEDURE — 82607 VITAMIN B-12: CPT

## 2022-05-06 PROCEDURE — 36415 COLL VENOUS BLD VENIPUNCTURE: CPT

## 2022-05-06 PROCEDURE — 76770 US EXAM ABDO BACK WALL COMP: CPT

## 2022-05-06 PROCEDURE — 82565 ASSAY OF CREATININE: CPT

## 2022-05-26 DIAGNOSIS — N31.9 NEUROGENIC BLADDER: ICD-10-CM

## 2022-05-26 DIAGNOSIS — N39.0 RECURRENT UTI: ICD-10-CM

## 2022-05-26 RX ORDER — SULFAMETHOXAZOLE/TRIMETHOPRIM 800-160 MG
1 TABLET ORAL DAILY
Qty: 90 TABLET | Refills: 0 | Status: SHIPPED | OUTPATIENT
Start: 2022-05-26 | End: 2022-06-03

## 2022-05-26 NOTE — TELEPHONE ENCOUNTER
sulfamethoxazole-trimethoprim (BACTRIM DS) 800-160 MG tablet      Last Written Prescription Date:  5-7-21  Last Fill Quantity: 90,   # refills: 3  Last Office Visit : 10-20-2 ( Opal,@[facility])  Future Office visit:  6-3-22 ( Kellen @ ))    Routing refill request to provider for review/approval because:  Med not on protocol

## 2022-06-01 NOTE — PROGRESS NOTES
Ahmet is a 47 year old who is being evaluated via a billable video visit.      How would you like to obtain your AVS? MyChart  If the video visit is dropped, the invitation should be resent by: Text to cell phone:    Will anyone else be joining your video visit? No      Video Start Time: 9:58 AM  Video-Visit Details    Type of service:  Video Visit    Video End Time:10:15 AM    Originating Location (pt. Location): Home    Distant Location (provider location):  [facility]     Platform used for Video Visit: Video Furnace      Follow-up Virtual Visit for Neurogenic Bladder    Name: Ahmet Castaneda    MRN: [id number]   YOB: 1975  Accompanied at today's visit by: self                 Chief Complaint:   Neurogenic Bladder         Assessment and Plan:   47 year old male with a history of bladder exstrophy who now has a non-continent ileal pouch. He is doing well with no UTIs. Established with PCP for newly diagnosed DM2 and started on metformin this week.  -Renal ultrasound and kidney function within normal limits.  -Continue Bactrim DS once daily. Refilled.   -Continue catheterization regimen.     Follow up: 1 year with me with renal u/s and vitamin B12 beforehand.    Yady Foreman PA-C  Amelia 3, 2022         History of Present Illness:   Ahmet Castaneda is a 47 year old male with a history of bladder exstrophy. Was initially treated with cystectomy and ureterosigmoidostomy. This was converted in 2001 to an ileal neobladder and Mitrofanoff because of premalignant lesions on colon. 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 Last cystoscopy was 2009. Had some incontinence between catheterizing in 2012 which has since improved somewhat. He leaks when the neobladder fills. He wears an ostomy appliance over his Mitrofanoff and opens the two piece to catheterize several times a day. This does not bother him much. Has been doing well on his  regimen of daily Septra DS with no UTI's. Last office visit with me on 5/7/21 at which time he was doing well. He was bothered by phimosis which had failed topical steroids. Subsequently underwent circumcision with Dr. Joseph on 10/4/21 and has healed well from that. Of note, we checked his A1C on 5/7/21 due to several elevated random blood glucose readings. This came back at 7.3 and he was recommended to follow up with PCP to discuss new diagnosis of DM2. Just this week, he was started on metformin. A1C on 6/1/22 was 6.6, same as last check on 9/27/21. Following with PCP through [facility].    Seen today via virtual visit for annual follow up with renal ultrasound and blood work done beforehand. He is doing well with no concerns.    The following subcategories were reviewed with the patient and updated accordingly. If no updates, this indicates no change since last visit:    Previous Bladder Surgeries:    Previous Bladder Augmentation: ileal neobladder    Catheterizable stoma: ileal Osvaldo in 1998. Revisions include: revision of stoma in 2001   Anti-incontinence procedures: none    Botox injections: None      Pertinent Medications:    Current Anticholinergics: None    Current Prophylactic antibiotics: Septra DS daily      Catheterization History:    The patient catheterizes per Osvaldo stoma with an 18F straight catheter q2 hours. Catheterization is performed by self. The patient uses a clean catheter each time.      Incontinence History:    He does leak between voids/caths per Osvaldo. He does not experience urgency of urination. He does not experience stress urinary incontinence. Patient leaks when neobladder fills, so he wears an ostomy device. He is not bothered by this at this time.      Urinary Tract Infection History:    Patient has been on Septra for many years and has not had UTI during this time. Had infection last time he attempted to wean off antibiotics so his previous urologist  recommended remaining on long-term abx ppx.    Bowel Movement History:    The patient has a bowel movement q1 days. Bowel regimen includes: none         Past Medical History:   No past medical history on file.         Past Surgical History:     Past Surgical History:   Procedure Laterality Date     CIRCUMCISION N/A 10/4/2021    Procedure: CIRCUMCISION;  Surgeon: Cruz Joseph MD;  Location: [facility] OR     extrophy of bladder       MITROFANOFF PROCEDURE (APPENDIX CONDUIT)      neobladder     ZZC LAP,SURG,COLECTOMY,W/ANAST              Allergies:   No Known Allergies         Medications:     Current Outpatient Medications   Medication Sig     sulfamethoxazole-trimethoprim (BACTRIM DS) 800-160 MG tablet Take 1 tablet by mouth daily     No current facility-administered medications for this visit.             Review of Systems:    ROS: 10 point ROS neg other than the symptoms noted above in the HPI and PMH.          Physical Exam:   GENERAL: Healthy, alert and no distress  EYES: Eyes grossly normal to inspection.  No discharge or erythema, or obvious scleral/conjunctival abnormalities.  RESP: No audible wheeze, cough, or visible cyanosis.  No visible retractions or increased work of breathing.    SKIN: Visible skin clear. No significant rash, abnormal pigmentation or lesions.  NEURO: Cranial nerves grossly intact.  Mentation and speech appropriate for age.  PSYCH: Mentation appears normal, affect normal/bright, judgement and insight intact, normal speech and appearance well-groomed.         Data:    Imaging:   Renal ultrasound   5/6/22     HISTORY:    Neurogenic bladder.     FINDINGS:    The right and left kidneys measure 12.7 cm and 12.3 cm,  respectively. Cortical echogenicity is normal bilaterally. No solid  renal mass, stone or hydronephrosis is identified. The bladder is  partially filled and appears unremarkable.                                                                      IMPRESSION: Normal renal  ultrasound.    Labs:  Last Comprehensive Metabolic Panel:  Sodium   Date Value Ref Range Status   05/07/2021 137 133 - 144 mmol/L Final     Potassium   Date Value Ref Range Status   05/07/2021 4.0 3.4 - 5.3 mmol/L Final     Chloride   Date Value Ref Range Status   05/07/2021 106 94 - 109 mmol/L Final     Carbon Dioxide   Date Value Ref Range Status   05/07/2021 25 20 - 32 mmol/L Final     Anion Gap   Date Value Ref Range Status   05/07/2021 6 3 - 14 mmol/L Final     Glucose   Date Value Ref Range Status   05/07/2021 138 (H) 70 - 99 mg/dL Final     Urea Nitrogen   Date Value Ref Range Status   05/07/2021 18 7 - 30 mg/dL Final     Creatinine   Date Value Ref Range Status   05/06/2022 0.91 0.66 - 1.25 mg/dL Final   05/07/2021 0.84 0.66 - 1.25 mg/dL Final     GFR Estimate   Date Value Ref Range Status   05/06/2022 >90 >60 mL/min/1.73m2 Final     Comment:     Effective December 21, 2021 eGFRcr in adults is calculated using the 2021 CKD-EPI creatinine equation which includes age and gender (Kirit hu al., NEJM, DOI: 10.1056/GECAjm4415556)   05/07/2021 >90 >60 mL/min/[1.73_m2] Final     Comment:     Non  GFR Calc  Starting 12/18/2018, serum creatinine based estimated GFR (eGFR) will be   calculated using the Chronic Kidney Disease Epidemiology Collaboration   (CKD-EPI) equation.       Calcium   Date Value Ref Range Status   05/07/2021 8.9 8.5 - 10.1 mg/dL Final         Vitamin B12 from 5/6/22: 441 pg/mL             33 minutes spent on the date of the encounter doing chart review, review of outside records, review of test results, interpretation of tests, patient visit and documentation

## 2022-06-03 ENCOUNTER — VIRTUAL VISIT (OUTPATIENT)
Dept: UROLOGY | Facility: CLINIC | Age: 47
End: 2022-06-03
Payer: COMMERCIAL

## 2022-06-03 ENCOUNTER — TELEPHONE (OUTPATIENT)
Dept: UROLOGY | Facility: CLINIC | Age: 47
End: 2022-06-03

## 2022-06-03 DIAGNOSIS — N39.0 RECURRENT UTI: ICD-10-CM

## 2022-06-03 DIAGNOSIS — N31.9 NEUROGENIC BLADDER: Primary | ICD-10-CM

## 2022-06-03 PROCEDURE — 99213 OFFICE O/P EST LOW 20 MIN: CPT | Mod: GT | Performed by: PHYSICIAN ASSISTANT

## 2022-06-03 RX ORDER — ROSUVASTATIN CALCIUM 20 MG/1
20 TABLET, COATED ORAL AT BEDTIME
COMMUNITY
Start: 2022-06-01

## 2022-06-03 RX ORDER — PREDNISONE 20 MG/1
TABLET ORAL
COMMUNITY
Start: 2022-06-01

## 2022-06-03 RX ORDER — SULFAMETHOXAZOLE/TRIMETHOPRIM 800-160 MG
1 TABLET ORAL DAILY
Qty: 90 TABLET | Refills: 3 | Status: SHIPPED | OUTPATIENT
Start: 2022-06-03 | End: 2023-06-30

## 2022-06-03 RX ORDER — METFORMIN HCL 500 MG
500 TABLET, EXTENDED RELEASE 24 HR ORAL
COMMUNITY
Start: 2022-06-01

## 2022-06-03 RX ORDER — OMEGA-3 FATTY ACIDS/FISH OIL 300-1000MG
CAPSULE ORAL
COMMUNITY

## 2022-06-03 RX ORDER — CALCIUM/MAGNESIUM/ZINC 333-133 MG
TABLET ORAL
COMMUNITY

## 2022-06-03 NOTE — TELEPHONE ENCOUNTER
[facility] Call Center    Phone Message    May a detailed message be left on voicemail: yes     Reason for Call: Patient calling clinic back for rooming 10am appt with Kellen. Writer cannot get through to backline. Please reach out. thankyou!  Action Taken: Message routed to:  [facility]    Travel Screening: Not Applicable

## 2022-06-03 NOTE — NURSING NOTE
Video visit Nurse Call Patients:     Are you in the [facility] at the time of the encounter? yes    Today's visit will be billed to you and your insurance.    A video visit is not as thorough as an in-person visit and the quality of the photograph sent may not be of the same quality as that taken by the dermatology clinic.

## 2022-11-19 ENCOUNTER — HEALTH MAINTENANCE LETTER (OUTPATIENT)
Age: 47
End: 2022-11-19

## 2023-04-09 ENCOUNTER — HEALTH MAINTENANCE LETTER (OUTPATIENT)
Age: 48
End: 2023-04-09

## 2023-05-03 ENCOUNTER — MYC MEDICAL ADVICE (OUTPATIENT)
Dept: UROLOGY | Facility: CLINIC | Age: 48
End: 2023-05-03
Payer: COMMERCIAL

## 2023-05-11 ENCOUNTER — MYC MEDICAL ADVICE (OUTPATIENT)
Dept: UROLOGY | Facility: CLINIC | Age: 48
End: 2023-05-11
Payer: COMMERCIAL

## 2023-05-16 ENCOUNTER — TELEPHONE (OUTPATIENT)
Dept: UROLOGY | Facility: CLINIC | Age: 48
End: 2023-05-16
Payer: COMMERCIAL

## 2023-05-16 NOTE — TELEPHONE ENCOUNTER
Patient contacted to let him know visit was switched to a virtual visit.     Jacqueline Marcano LPN on 5/16/2023 at 3:47 PM

## 2023-05-16 NOTE — TELEPHONE ENCOUNTER
[facility] Call Center    Phone Message    May a detailed message be left on voicemail: yes     Reason for Call: Patient calling to see if his appt on 6/30 can be changed to a video? Writer not seeing video visits available until August. Please review and confirm with patient. Thank you    Action Taken: Message routed to:  [facility]    Travel Screening: Not Applicable

## 2023-06-02 ENCOUNTER — LAB (OUTPATIENT)
Dept: LAB | Facility: CLINIC | Age: 48
End: 2023-06-02
Payer: COMMERCIAL

## 2023-06-02 ENCOUNTER — ANCILLARY PROCEDURE (OUTPATIENT)
Dept: ULTRASOUND IMAGING | Facility: CLINIC | Age: 48
End: 2023-06-02
Attending: PHYSICIAN ASSISTANT
Payer: COMMERCIAL

## 2023-06-02 DIAGNOSIS — N31.9 NEUROGENIC BLADDER: ICD-10-CM

## 2023-06-02 LAB — VIT B12 SERPL-MCNC: 642 PG/ML (ref 232–1245)

## 2023-06-02 PROCEDURE — 36415 COLL VENOUS BLD VENIPUNCTURE: CPT

## 2023-06-02 PROCEDURE — 82607 VITAMIN B-12: CPT

## 2023-06-02 PROCEDURE — 76770 US EXAM ABDO BACK WALL COMP: CPT | Performed by: RADIOLOGY

## 2023-06-29 NOTE — PROGRESS NOTES
Follow-up Virtual Visit for Neurogenic Bladder    Name: Ahmet Castaneda    MRN: [id number]   YOB: 1975  Accompanied at today's visit by: self                 Chief Complaint:   Neurogenic Bladder         Assessment and Plan:   48 year old male with a history of bladder exstrophy who now has a non-continent ileal pouch. He is doing well with no UTIs.   -Renal ultrasound, kidney function, and vitamin B12 within normal limits.  -Continue Bactrim DS once daily. Refilled.   -Continue catheterization regimen.   -Continue follow up with PCP for DM2.    Follow up: 1 year with me with renal u/s and vitamin B12 beforehand.    Yady Foreman PA-C  June 30, 2023         History of Present Illness:   Ahmet Castaneda is a 48 year old male with a history of bladder exstrophy. Was initially treated with cystectomy and ureterosigmoidostomy. This was converted in 2001 to an ileal neobladder and Mitrofanoff because of premalignant lesions on colon. 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 Last cystoscopy was 2009. Had some incontinence between catheterizing in 2012 which has since improved somewhat. He leaks when the neobladder fills. He wears an ostomy appliance over his Mitrofanoff and opens the two piece to catheterize several times a day. This does not bother him much. Has been doing well on his regimen of daily Septra DS with no UTI's. Also with a history of phimosis which failed topical steroids so underwent circumcision with Dr. Joseph on 10/4/21 and healed well from that. Of note, we checked his A1C on 5/7/21 due to several elevated random blood glucose readings. This came back at 7.3 and he was recommended to follow up with PCP to discuss new diagnosis of DM2. He was started on metformin and repeat A1C on 6/1/22 was 6.6. Following with PCP through [facility].    Seen today via virtual visit for annual follow up with renal ultrasound and  blood work done earlier in the month. He is doing well with no concerns. A1C was up to 7.9 last month so PCP doubled his metformin. Plan to recheck this Fall.    The following subcategories were reviewed with the patient and updated accordingly. If no updates, this indicates no change since last visit:    Previous Bladder Surgeries:    Previous Bladder Augmentation: ileal neobladder    Catheterizable stoma: ileal Osvalod in 1998. Revisions include: revision of stoma in 2001   Anti-incontinence procedures: none    Botox injections: None      Pertinent Medications:    Current Anticholinergics: None    Current Prophylactic antibiotics: Septra DS daily      Catheterization History:    The patient catheterizes per Osvaldo stoma with an 18F straight catheter q2 hours. Catheterization is performed by self. The patient uses a clean catheter each time.      Incontinence History:    He does leak between voids/caths per Osvaldo. He does not experience urgency of urination. He does not experience stress urinary incontinence. Patient leaks when neobladder fills, so he wears an ostomy device. He is not bothered by this at this time.      Urinary Tract Infection History:    Patient has been on Septra for many years and has not had UTI during this time. Had infection last time he attempted to wean off antibiotics so his previous urologist recommended remaining on long-term abx ppx.    Bowel Movement History:    The patient has a bowel movement q1 days. Bowel regimen includes: none         Past Medical History:   No past medical history on file.         Past Surgical History:     Past Surgical History:   Procedure Laterality Date     CIRCUMCISION N/A 10/4/2021    Procedure: CIRCUMCISION;  Surgeon: Cruz Joseph MD;  Location: [facility] OR     extrophy of bladder       MITROFANOFF PROCEDURE (APPENDIX CONDUIT)      neobladder     ZZC LAP,SURG,COLECTOMY,W/ANAST              Allergies:   No Known Allergies         Medications:     Current  Outpatient Medications   Medication Sig     cholecalciferol (VITAMIN D3) 125 mcg (5000 units) capsule Take by mouth daily     metFORMIN (GLUCOPHAGE XR) 500 MG 24 hr tablet Take 500 mg by mouth     Milk Thistle-Dand-Fennel-Licor (MILK THISTLE XTRA) CAPS capsule      omega 3 1000 MG CAPS      predniSONE (DELTASONE) 20 MG tablet TAKE 2 TABLETS BY MOUTH ONCE DAILY FOR 5 DAYS THEN 1 ONCE DAILY FOR 5 DAYS . TAKE WITH FOOD.     rosuvastatin (CRESTOR) 20 MG tablet Take 20 mg by mouth At Bedtime     sulfamethoxazole-trimethoprim (BACTRIM DS) 800-160 MG tablet Take 1 tablet by mouth daily     TART CHERRY PO      No current facility-administered medications for this visit.             Review of Systems:    ROS: 10 point ROS neg other than the symptoms noted above in the HPI and PMH.          Physical Exam:   GENERAL: Healthy, alert and no distress  EYES: Eyes grossly normal to inspection.  No discharge or erythema, or obvious scleral/conjunctival abnormalities.  RESP: No audible wheeze, cough, or visible cyanosis.  No visible retractions or increased work of breathing.    SKIN: Visible skin clear. No significant rash, abnormal pigmentation or lesions.  NEURO: Cranial nerves grossly intact.  Mentation and speech appropriate for age.  PSYCH: Mentation appears normal, affect normal/bright, judgement and insight intact, normal speech and appearance well-groomed.         Data:    Imaging:   Renal ultrasound   6/2/2023  FINDINGS:  Right kidney: 12.7 cm  cm in length. Parenchyma is of normal thickness  and echogenicity.  No solid mass. No hydronephrosis.  Left kidney: 12.3 cm cm in length. Parenchyma is of normal thickness  and echogenicity.  No solid mass.  No hydronephrosis.      Bladder: Partially distended.  No mass-like lesions.  Has Mitrofanoff.                                                                      IMPRESSION:  1.  No hydronephrosis or solid mass.    Labs:  Serum Cr 5/11/23: 0.81    Vitamin B12 6/2/23: 642  pg/mL    Hgb A1C 5/11/23: 7.9         20 minutes spent on the date of the encounter doing chart review, review of outside records, review of test results, interpretation of tests, patient visit and documentation

## 2023-06-30 ENCOUNTER — VIRTUAL VISIT (OUTPATIENT)
Dept: UROLOGY | Facility: CLINIC | Age: 48
End: 2023-06-30
Payer: COMMERCIAL

## 2023-06-30 DIAGNOSIS — N31.9 NEUROGENIC BLADDER: Primary | ICD-10-CM

## 2023-06-30 DIAGNOSIS — N39.0 RECURRENT UTI: ICD-10-CM

## 2023-06-30 PROCEDURE — 99213 OFFICE O/P EST LOW 20 MIN: CPT | Mod: VID | Performed by: PHYSICIAN ASSISTANT

## 2023-06-30 RX ORDER — LISINOPRIL 5 MG/1
1 TABLET ORAL DAILY
COMMUNITY
Start: 2023-05-11

## 2023-06-30 RX ORDER — SULFAMETHOXAZOLE/TRIMETHOPRIM 800-160 MG
1 TABLET ORAL DAILY
Qty: 90 TABLET | Refills: 3 | Status: SHIPPED | OUTPATIENT
Start: 2023-06-30 | End: 2024-06-28

## 2023-06-30 NOTE — NURSING NOTE
Is the patient currently in the state of [place]? YES    Visit mode:VIDEO    If the visit is dropped, the patient can be reconnected by: VIDEO VISIT: Text to cell phone: [phone]    Will anyone else be joining the visit? NO      How would you like to obtain your AVS? MyChart    Are changes needed to the allergy or medication list? NO    Reason for visit: RECHECK (1 year follow-up with imaging)

## 2023-06-30 NOTE — PROGRESS NOTES
Virtual Visit Details    Type of service:  Video Visit     Video start time: 10:27 AM    Video end time: 10:41 AM    Originating Location (pt. Location): Home    Distant Location (provider location):  On-site  Platform used for Video Visit: [place]

## 2023-06-30 NOTE — PATIENT INSTRUCTIONS
UROLOGY CLINIC VISIT PATIENT INSTRUCTIONS    Follow up with Yady Foreman PA-C in 1 year with renal ultrasound and vitamin B12 lab prior.     If you have any issues, questions or concerns in the meantime, do not hesitate to contact us at [phone] or via [employer].     It was a pleasure meeting with you today.  Thank you for allowing me and my team the privilege of caring for you today.  YOU are the reason we are here, and I truly hope we provided you with the excellent service you deserve.  Please let us know if there is anything else we can do for you so that we can be sure you are leaving completely satisfied with your care experience.

## 2023-07-03 ENCOUNTER — TELEPHONE (OUTPATIENT)
Dept: UROLOGY | Facility: CLINIC | Age: 48
End: 2023-07-03
Payer: COMMERCIAL

## 2023-07-03 NOTE — TELEPHONE ENCOUNTER
Left Voicemail (1st Attempt) for the patient to call back and schedule the following:    Appointment type: Return  Provider: Yady Foreman PA-C  Return date: 6/30/2024  Specialty phone number: [phone]  Additional appointment(s) needed: Renal US and B12 labs prior to follow up.   Additonal Notes: Follow up (virtual or in person) with Yady Foreman PA-C in 1 year with renal ultrasound and vitamin B12 lab prior    [facility] CLAUDY haney Procedure   Dermatology, Surgery, Urology  [facility]

## 2023-09-10 ENCOUNTER — HEALTH MAINTENANCE LETTER (OUTPATIENT)
Age: 48
End: 2023-09-10

## 2023-10-03 ENCOUNTER — TELEPHONE (OUTPATIENT)
Dept: UROLOGY | Facility: CLINIC | Age: 48
End: 2023-10-03
Payer: COMMERCIAL

## 2023-10-03 NOTE — TELEPHONE ENCOUNTER
Left Voicemail (2nd Attempt) for the patient to call back and schedule the following:    Appointment type: Return  Provider: Yady Foreman PA-C  Return date: 6/30/2024  Specialty phone number: [phone]  Additional appointment(s) needed: Renal Ultrasound + B12 lab  Additonal Notes: Follow up (virtual or in person) with Yady Foreman PA-C in 1 year with renal ultrasound and vitamin B12 lab prior.     Tosha haney Procedure   Dermatology, Surgery, Urology  [facility]

## 2023-10-16 ENCOUNTER — TELEPHONE (OUTPATIENT)
Dept: UROLOGY | Facility: CLINIC | Age: 48
End: 2023-10-16
Payer: COMMERCIAL

## 2023-10-16 DIAGNOSIS — N31.9 NEUROGENIC BLADDER: Primary | ICD-10-CM

## 2023-10-16 NOTE — TELEPHONE ENCOUNTER
Order placed.     Called and LVM for patient. Please inform patient lab is placed.     Jacqueline Marcano LPN on 10/16/2023 at 4:34 PM

## 2023-10-16 NOTE — TELEPHONE ENCOUNTER
Health Call Center    Phone Message    May a detailed message be left on voicemail: yes     Reason for Call: Other: Pt states he needs labs ordered  before his next appt with Kellen. Per her last notes, he should have a  b12 lab prior. Not current order in his chart. Please follow-up with pt and call him once orders are placed so he can make appt.    Action Taken: Routed to  Urology    Travel Screening: Not Applicable

## 2023-10-19 NOTE — TELEPHONE ENCOUNTER
Patient is aware that the lab order is in. Patient thanked staff for the call.     Jacqueline Marcano LPN on 10/19/2023 at 8:58 AM

## 2024-01-28 ENCOUNTER — HEALTH MAINTENANCE LETTER (OUTPATIENT)
Age: 49
End: 2024-01-28

## 2024-06-16 ENCOUNTER — HEALTH MAINTENANCE LETTER (OUTPATIENT)
Age: 49
End: 2024-06-16

## 2024-06-28 ENCOUNTER — ANCILLARY PROCEDURE (OUTPATIENT)
Dept: ULTRASOUND IMAGING | Facility: CLINIC | Age: 49
End: 2024-06-28
Attending: PHYSICIAN ASSISTANT
Payer: COMMERCIAL

## 2024-06-28 ENCOUNTER — LAB (OUTPATIENT)
Dept: LAB | Facility: CLINIC | Age: 49
End: 2024-06-28
Payer: COMMERCIAL

## 2024-06-28 ENCOUNTER — OFFICE VISIT (OUTPATIENT)
Dept: UROLOGY | Facility: CLINIC | Age: 49
End: 2024-06-28
Payer: COMMERCIAL

## 2024-06-28 DIAGNOSIS — N31.9 NEUROGENIC BLADDER: ICD-10-CM

## 2024-06-28 DIAGNOSIS — N39.0 RECURRENT UTI: ICD-10-CM

## 2024-06-28 DIAGNOSIS — N31.9 NEUROGENIC BLADDER: Primary | ICD-10-CM

## 2024-06-28 LAB — VIT B12 SERPL-MCNC: 438 PG/ML (ref 232–1245)

## 2024-06-28 PROCEDURE — 76770 US EXAM ABDO BACK WALL COMP: CPT | Performed by: STUDENT IN AN ORGANIZED HEALTH CARE EDUCATION/TRAINING PROGRAM

## 2024-06-28 PROCEDURE — 36415 COLL VENOUS BLD VENIPUNCTURE: CPT

## 2024-06-28 PROCEDURE — 82607 VITAMIN B-12: CPT

## 2024-06-28 PROCEDURE — 99214 OFFICE O/P EST MOD 30 MIN: CPT | Performed by: PHYSICIAN ASSISTANT

## 2024-06-28 RX ORDER — METOPROLOL TARTRATE 25 MG/1
25 TABLET, FILM COATED ORAL 2 TIMES DAILY
COMMUNITY

## 2024-06-28 RX ORDER — SULFAMETHOXAZOLE/TRIMETHOPRIM 800-160 MG
1 TABLET ORAL DAILY
Qty: 90 TABLET | Refills: 3 | Status: SHIPPED | OUTPATIENT
Start: 2024-06-28

## 2024-06-28 NOTE — PROGRESS NOTES
Follow-up Virtual Visit for Neurogenic Bladder    Name: Ahmet Castaneda    MRN: [id number]   YOB: 1975  Accompanied at today's visit by: self                 Chief Complaint:   Neurogenic Bladder         Assessment and Plan:   49 year old male with a history of bladder exstrophy who now has a non-continent ileal pouch. He is doing well with no UTIs. Had some intermittent left flank pain >1 month ago, now resolved. Renal ultrasound today is without hydronephrosis or stones. He will monitor and let us know if this recurs.    -Continue Bactrim DS once daily. Refilled.   -Continue catheterization regimen.     Follow up: 1 year with Dr. Biggs with renal ultrasound and vitamin B12 beforehand.    Yady Foreman PA-C  June 28, 2024         History of Present Illness:   Ahmet Castaneda is a 49 year old male with a history of bladder exstrophy. Was initially treated with cystectomy and ureterosigmoidostomy. This was converted in 2001 to an ileal neobladder and Mitrofanoff because of premalignant lesions on colon. 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 Last cystoscopy was 2009. Had some incontinence between catheterizing in 2012 which has since improved somewhat. He leaks when the neobladder fills. He wears an ostomy appliance over his Mitrofanoff and opens the two piece to catheterize several times a day. This does not bother him much. Has been doing well on his regimen of daily Septra DS with no UTI's. Also with a history of phimosis which failed topical steroids so underwent circumcision with Dr. Joseph on 10/4/21 and healed well from that. Of note, we checked his A1C on 5/7/21 due to several elevated random blood glucose readings. This came back at 7.3 and he was recommended to follow up with PCP to discuss new diagnosis of DM2. He was started on metformin and repeat A1C on 6/1/22 was 6.6. Following with PCP through [facility].  Recently diagnosed with A fib, now taking Eliquis.  Seen today for annual follow up with renal ultrasound and blood work done beforehand. He is doing well with no concerns at this time. He did have some intermittent left flank pain a few months ago. He took a dose of leftover Cipro and the pain has resolved as of 4 weeks ago. No blood in the urine. Feels well today.  The following subcategories were reviewed with the patient and updated accordingly. If no updates, this indicates no change since last visit:    Previous Bladder Surgeries:    Previous Bladder Augmentation: ileal neobladder    Catheterizable stoma: ileal Osvaldo in 1998. Revisions include: revision of stoma in 2001   Anti-incontinence procedures: none    Botox injections: None      Pertinent Medications:    Current Anticholinergics: None    Current Prophylactic antibiotics: Septra DS daily      Catheterization History:    The patient catheterizes per Osvaldo stoma with an 18F straight catheter q2 hours. Catheterization is performed by self. The patient uses a clean catheter each time.      Incontinence History:    He does leak between voids/caths per Osvaldo. He does not experience urgency of urination. He does not experience stress urinary incontinence. Patient leaks when neobladder fills, so he wears an ostomy device. He is not bothered by this at this time.      Urinary Tract Infection History:    Patient has been on Septra for many years and has not had UTI during this time. Had infection last time he attempted to wean off antibiotics so his previous urologist recommended remaining on long-term abx ppx.    Bowel Movement History:    The patient has a bowel movement q1 days. Bowel regimen includes: none         Past Medical History:   No past medical history on file.         Past Surgical History:     Past Surgical History:   Procedure Laterality Date    CIRCUMCISION N/A 10/4/2021    Procedure: CIRCUMCISION;  Surgeon: Cruz Joseph MD;  [place] [facility] [place]    extrophy of bladder      MITROFANOFF PROCEDURE (APPENDIX CONDUIT)      neobladder    ZZC LAP,SURG,COLECTOMY,W/ANAST              Allergies:   No Known Allergies         Medications:     Current Outpatient Medications   Medication Sig Dispense Refill    apixaban ANTICOAGULANT (ELIQUIS) 5 MG tablet Take 1 tablet by mouth 2 times daily      cholecalciferol (VITAMIN D3) 125 mcg (5000 units) capsule Take by mouth daily      lisinopril (ZESTRIL) 5 MG tablet Take 1 tablet by mouth daily      metFORMIN (GLUCOPHAGE XR) 500 MG 24 hr tablet Take 500 mg by mouth      metoprolol tartrate (LOPRESSOR) 25 MG tablet Take 25 mg by mouth 2 times daily      Milk Thistle-Dand-Fennel-Licor (MILK THISTLE XTRA) CAPS capsule       omega 3 1000 MG CAPS       predniSONE (DELTASONE) 20 MG tablet TAKE 2 TABLETS BY MOUTH ONCE DAILY FOR 5 DAYS THEN 1 ONCE DAILY FOR 5 DAYS . TAKE WITH FOOD.      rosuvastatin (CRESTOR) 20 MG tablet Take 20 mg by mouth At Bedtime      sulfamethoxazole-trimethoprim (BACTRIM DS) 800-160 MG tablet Take 1 tablet by mouth daily 90 tablet 3    TART CHERRY PO        No current facility-administered medications for this visit.          Physical Exam:   There were no vitals taken for this visit.  GENERAL: Healthy, alert and no distress  EYES: Eyes grossly normal to inspection.  No discharge or erythema, or obvious scleral/conjunctival abnormalities.  RESP: No audible wheeze, cough, or visible cyanosis.  No visible retractions or increased work of breathing.    SKIN: Visible skin clear. No significant rash, abnormal pigmentation or lesions.  NEURO: Cranial nerves grossly intact.  Mentation and speech appropriate for age.  PSYCH: Mentation appears normal, affect normal/bright, judgement and insight intact, normal speech and appearance well-groomed.         Data:    Imaging:   Renal ultrasound   6/28/2024  FINDINGS:     Right kidney: Measures 12.6 cm in length. Parenchyma is of normal  thickness and echogenicity. No focal  mass. No hydronephrosis.     Left kidney: Measures 12.3 cm in length. Parenchyma is of normal  thickness and echogenicity. No focal mass. No hydronephrosis.      Known ileal neobladder and Mitrofanoff with suspended debris noted.                                                                  IMPRESSION:  Negative for hydronephrosis.    Labs:  Serum Cr 5/14/24: 1.15    Vitamin B12 6/2/23: 642 pg/mL  Vitamin B12 from today in process    Hgb A1C 5/14/24: 7.1         36 minutes spent on the date of the encounter doing chart review, review of outside records, review of test results, interpretation of tests, patient visit, and documentation

## 2024-06-28 NOTE — PATIENT INSTRUCTIONS
UROLOGY CLINIC VISIT PATIENT INSTRUCTIONS    Follow up in 1 year with Dr. Biggs with a renal ultrasound and labs beforehand.    If you have any issues, questions or concerns in the meantime, do not hesitate to contact us at [phone] or via [employer].     It was a pleasure meeting with you today.  Thank you for allowing me and my team the privilege of caring for you today.  YOU are the reason we are here, and I truly hope we provided you with the excellent service you deserve.  Please let us know if there is anything else we can do for you so that we can be sure you are leaving completely satisfied with your care experience.

## 2024-06-28 NOTE — NURSING NOTE
Ahmet Castaneda's goals for this visit include:   Chief Complaint   Patient presents with    RECHECK     neurogenic bladder     Needs refill Bactrim would like 90 day supply due to insurance.       He requests these members of his care team be copied on today's visit information:       PCP: Yady Foreman    Referring Provider:  ELISSA Engel  [address] [place] [address]    There were no vitals taken for this visit.    Do you need any medication refills at today's visit?     Jacqueline Marcano LPN on 6/28/2024 at 8:32 AM

## 2024-07-01 ENCOUNTER — TELEPHONE (OUTPATIENT)
Dept: UROLOGY | Facility: CLINIC | Age: 49
End: 2024-07-01
Payer: COMMERCIAL

## 2024-07-01 NOTE — TELEPHONE ENCOUNTER
Left Voicemail (1st Attempt) for the patient to call back and schedule the following:    Appointment type: Return  Provider: Dr. Biggs  Return date: 6/28/2025  Specialty phone number: [phone]  Additional appointment(s) needed: Renal Ultrasound scheduled prior to appointment  Additonal Notes: Follow up in 1 year with Dr. Biggs with a renal ultrasound beforehand     Tosha haney Complex   Dermatology, Surgery, Urology  [facility]

## 2024-09-21 ENCOUNTER — MYC MEDICAL ADVICE (OUTPATIENT)
Dept: UROLOGY | Facility: CLINIC | Age: 49
End: 2024-09-21
Payer: COMMERCIAL

## 2024-09-23 ENCOUNTER — TELEPHONE (OUTPATIENT)
Dept: UROLOGY | Facility: CLINIC | Age: 49
End: 2024-09-23
Payer: COMMERCIAL

## 2024-11-03 ENCOUNTER — HEALTH MAINTENANCE LETTER (OUTPATIENT)
Age: 49
End: 2024-11-03

## 2025-03-01 ENCOUNTER — HEALTH MAINTENANCE LETTER (OUTPATIENT)
Age: 50
End: 2025-03-01

## 2025-06-03 ENCOUNTER — TELEPHONE (OUTPATIENT)
Dept: UROLOGY | Facility: CLINIC | Age: 50
End: 2025-06-03
Payer: COMMERCIAL

## 2025-06-03 DIAGNOSIS — N31.9 NEUROGENIC BLADDER: Primary | ICD-10-CM

## 2025-06-03 NOTE — TELEPHONE ENCOUNTER
Patient is scheduled for an upcoming appointment with Dr. Biggs  on 6/19/25.     Per last note patient was to:1 year with Dr. Biggs with renal ultrasound and vitamin B12 beforehand.    Orders placed: B12 order pended for RN to sign.     Patient is scheduled 6/19/25 to complete lab/imaging.     Tosha Escalante on 6/3/2025 at 11:09 AM    Future Appointments 6/3/2025 - 11/30/2025        Date Visit Type Length Department Provider     6/19/2025  9:15 AM LAB 15 min [facility] LABORATORY LAB FIRST FLOOR [facility]    Location Instructions:     The clinic is located at [address] in [place].&nbsp; We offer free parking in our on-site lot.              6/19/2025  9:30 AM US RENAL COMPLETE NON-VASCULAR 30 min MG ULTRASOUND MGUS1    Location Instructions:     [address] [place] [address]  Parking Imaging customers can park in the lots on either side of the driveway leading to the West Entrance of the building.  Entrance and check-in location Enter through the West Entrance doors. Proceed straight ahead, through the lobby, to Check-In B (adjacent to the [facility]). Please check-in at with the registration staff at the desk labeled Check-In B.              6/19/2025 10:00 AM RETURN PATIENT 15 min MG UROLOGY Jamil Biggs MD

## 2025-06-19 ENCOUNTER — TELEPHONE (OUTPATIENT)
Dept: UROLOGY | Facility: CLINIC | Age: 50
End: 2025-06-19

## 2025-06-19 ENCOUNTER — RESULTS FOLLOW-UP (OUTPATIENT)
Dept: GASTROENTEROLOGY | Facility: CLINIC | Age: 50
End: 2025-06-19

## 2025-06-19 ENCOUNTER — ANCILLARY PROCEDURE (OUTPATIENT)
Dept: ULTRASOUND IMAGING | Facility: CLINIC | Age: 50
End: 2025-06-19
Attending: PHYSICIAN ASSISTANT
Payer: COMMERCIAL

## 2025-06-19 ENCOUNTER — OFFICE VISIT (OUTPATIENT)
Dept: UROLOGY | Facility: CLINIC | Age: 50
End: 2025-06-19
Payer: COMMERCIAL

## 2025-06-19 ENCOUNTER — LAB (OUTPATIENT)
Dept: LAB | Facility: CLINIC | Age: 50
End: 2025-06-19
Payer: COMMERCIAL

## 2025-06-19 DIAGNOSIS — N31.9 NEUROGENIC BLADDER: ICD-10-CM

## 2025-06-19 DIAGNOSIS — N39.0 RECURRENT UTI: ICD-10-CM

## 2025-06-19 LAB — VIT B12 SERPL-MCNC: 451 PG/ML (ref 232–1245)

## 2025-06-19 PROCEDURE — 76770 US EXAM ABDO BACK WALL COMP: CPT

## 2025-06-19 RX ORDER — BLOOD-GLUCOSE METER
EACH MISCELLANEOUS
COMMUNITY
Start: 2025-03-19

## 2025-06-19 RX ORDER — SULFAMETHOXAZOLE AND TRIMETHOPRIM 800; 160 MG/1; MG/1
1 TABLET ORAL DAILY
Qty: 90 TABLET | Refills: 3 | Status: SHIPPED | OUTPATIENT
Start: 2025-06-19

## 2025-06-19 RX ORDER — LANCETS 33 GAUGE
EACH MISCELLANEOUS
COMMUNITY
Start: 2025-04-22

## 2025-06-19 RX ORDER — BLOOD SUGAR DIAGNOSTIC
1 STRIP MISCELLANEOUS DAILY
COMMUNITY
Start: 2025-06-12

## 2025-06-19 NOTE — TELEPHONE ENCOUNTER
Left Voicemail (1st Attempt) for the patient to call back and schedule the following:    Appointment type: Return  Provider: Avinash Crurie PA-C  Return date: 6/19/2026  Specialty phone number: [phone]  Additonal Notes: Return in about 1 year (around 6/19/2026)      Lidia haney Complex   Dermatology, Urology, General Surgery Specialties   [facility]

## 2025-06-19 NOTE — NURSING NOTE
Ahmet Castaneda's goals for this visit include:   Chief Complaint   Patient presents with    RECHECK     Neurogenic Bladder       He requests these members of his care team be copied on today's visit information:       PCP: Yady Foreman    Referring Provider:  ELISSA Engel  [address] [place] [address]        Do you need any medication refills at today's visit?       Jacqueline Marcano LPN on 6/19/2025 at 9:50 AM
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
## 2025-06-21 ENCOUNTER — HEALTH MAINTENANCE LETTER (OUTPATIENT)
Age: 50
End: 2025-06-21

## (undated) DEVICE — ESU ELEC BLADE 2.75" COATED/INSULATED E1455

## (undated) DEVICE — SU VICRYL 4-0 RB-1 27" UND J214H

## (undated) DEVICE — PREP SKIN SCRUB TRAY 4461A

## (undated) DEVICE — GLOVE PROTEXIS POWDER FREE SMT 7.5  2D72PT75X

## (undated) DEVICE — SUPPORTER ATHLETIC LG LATEX 202636

## (undated) DEVICE — SUCTION MANIFOLD NEPTUNE 2 SYS 1 PORT 702-025-000

## (undated) DEVICE — ESU CORD BIPOLAR GREEN 10-4000

## (undated) DEVICE — PACK MINOR CUSTOM ASC

## (undated) DEVICE — ESU GROUND PAD ADULT W/CORD E7507

## (undated) DEVICE — BNDG KLING 2" 2231

## (undated) DEVICE — RX BACITRACIN OINTMENT 0.9G 1/32OZ CUR001109

## (undated) DEVICE — DRAPE LAP PEDS DISP 29492

## (undated) DEVICE — DRSG GAUZE 4X4" 3033

## (undated) DEVICE — PREP POVIDONE IODINE SOLUTION 10% 4OZ BOTTLE 29906-004

## (undated) DEVICE — SOL NACL 0.9% IRRIG 500ML BOTTLE 2F7123

## (undated) DEVICE — LINEN TOWEL PACK X5 5464

## (undated) DEVICE — SU VICRYL 5-0 P-3 18" UND J493G

## (undated) DEVICE — GLOVE PROTEXIS W/NEU-THERA 7.5  2D73TE75

## (undated) DEVICE — PREP POVIDONE-IODINE 7.5% SCRUB 4OZ BOTTLE MDS093945

## (undated) RX ORDER — ACETAMINOPHEN 325 MG/1
TABLET ORAL
Status: DISPENSED
Start: 2021-10-04

## (undated) RX ORDER — CEFAZOLIN SODIUM 2 G/50ML
SOLUTION INTRAVENOUS
Status: DISPENSED
Start: 2021-10-04

## (undated) RX ORDER — CEFAZOLIN SODIUM 1 G/3ML
INJECTION, POWDER, FOR SOLUTION INTRAMUSCULAR; INTRAVENOUS
Status: DISPENSED
Start: 2021-10-04

## (undated) RX ORDER — FENTANYL CITRATE 50 UG/ML
INJECTION, SOLUTION INTRAMUSCULAR; INTRAVENOUS
Status: DISPENSED
Start: 2021-10-04

## (undated) RX ORDER — ONDANSETRON 2 MG/ML
INJECTION INTRAMUSCULAR; INTRAVENOUS
Status: DISPENSED
Start: 2021-10-04

## (undated) RX ORDER — PROPOFOL 10 MG/ML
INJECTION, EMULSION INTRAVENOUS
Status: DISPENSED
Start: 2021-10-04

## (undated) RX ORDER — BUPIVACAINE HYDROCHLORIDE 5 MG/ML
INJECTION, SOLUTION EPIDURAL; INTRACAUDAL
Status: DISPENSED
Start: 2021-10-04

## (undated) RX ORDER — FENTANYL CITRATE 50 UG/ML
INJECTION, SOLUTION INTRAMUSCULAR; INTRAVENOUS
Status: DISPENSED
Start: 2018-01-31